# Patient Record
Sex: FEMALE | Race: WHITE | Employment: FULL TIME | URBAN - METROPOLITAN AREA
[De-identification: names, ages, dates, MRNs, and addresses within clinical notes are randomized per-mention and may not be internally consistent; named-entity substitution may affect disease eponyms.]

---

## 2017-01-30 ENCOUNTER — HOSPITAL ENCOUNTER (OUTPATIENT)
Dept: CT IMAGING | Age: 57
Discharge: HOME | End: 2017-01-30
Payer: COMMERCIAL

## 2017-01-30 DIAGNOSIS — T14.8XXA SPRAIN: ICD-10-CM

## 2017-01-30 DIAGNOSIS — S60.221A CONTUSION OF RIGHT HAND, INITIAL ENCOUNTER: ICD-10-CM

## 2017-01-30 PROCEDURE — 73200 CT UPPER EXTREMITY W/O DYE: CPT

## 2020-12-03 ENCOUNTER — APPOINTMENT (OUTPATIENT)
Dept: CT IMAGING | Age: 60
End: 2020-12-03

## 2020-12-03 ENCOUNTER — APPOINTMENT (OUTPATIENT)
Dept: GENERAL RADIOLOGY | Age: 60
End: 2020-12-03

## 2020-12-03 ENCOUNTER — HOSPITAL ENCOUNTER (EMERGENCY)
Age: 60
Discharge: HOME | End: 2020-12-03
Attending: EMERGENCY MEDICINE

## 2020-12-03 VITALS
RESPIRATION RATE: 17 BRPM | BODY MASS INDEX: 22.86 KG/M2 | OXYGEN SATURATION: 99 % | WEIGHT: 129 LBS | HEIGHT: 63 IN | DIASTOLIC BLOOD PRESSURE: 75 MMHG | SYSTOLIC BLOOD PRESSURE: 133 MMHG | TEMPERATURE: 98.1 F | HEART RATE: 94 BPM

## 2020-12-03 PROCEDURE — 73110 X-RAY EXAM OF WRIST: CPT

## 2020-12-03 PROCEDURE — 29125 APPL SHORT ARM SPLINT STATIC: CPT

## 2020-12-03 PROCEDURE — 6370000000 HC RX 637 (ALT 250 FOR IP): Performed by: EMERGENCY MEDICINE

## 2020-12-03 PROCEDURE — 70450 CT HEAD/BRAIN W/O DYE: CPT

## 2020-12-03 PROCEDURE — 99285 EMERGENCY DEPT VISIT HI MDM: CPT

## 2020-12-03 RX ORDER — ACETAMINOPHEN 500 MG
1000 TABLET ORAL ONCE
Status: COMPLETED | OUTPATIENT
Start: 2020-12-03 | End: 2020-12-03

## 2020-12-03 RX ADMIN — ACETAMINOPHEN 1000 MG: 500 TABLET ORAL at 08:06

## 2020-12-03 ASSESSMENT — ENCOUNTER SYMPTOMS
BACK PAIN: 0
VOMITING: 0
ABDOMINAL PAIN: 0
SHORTNESS OF BREATH: 0
DIARRHEA: 0
SORE THROAT: 0
NAUSEA: 0
COUGH: 0

## 2020-12-03 ASSESSMENT — PAIN DESCRIPTION - FREQUENCY
FREQUENCY: CONTINUOUS
FREQUENCY: CONTINUOUS

## 2020-12-03 ASSESSMENT — PAIN DESCRIPTION - LOCATION
LOCATION: HEAD;WRIST
LOCATION: WRIST;HEAD

## 2020-12-03 ASSESSMENT — PAIN DESCRIPTION - DESCRIPTORS
DESCRIPTORS: ACHING
DESCRIPTORS: ACHING

## 2020-12-03 ASSESSMENT — PAIN DESCRIPTION - ORIENTATION
ORIENTATION: LEFT
ORIENTATION: LEFT

## 2020-12-03 ASSESSMENT — PAIN DESCRIPTION - PROGRESSION
CLINICAL_PROGRESSION: GRADUALLY WORSENING
CLINICAL_PROGRESSION: GRADUALLY IMPROVING

## 2020-12-03 ASSESSMENT — PAIN DESCRIPTION - ONSET
ONSET: ON-GOING
ONSET: ON-GOING

## 2020-12-03 ASSESSMENT — PAIN DESCRIPTION - PAIN TYPE
TYPE: ACUTE PAIN
TYPE: ACUTE PAIN

## 2020-12-03 ASSESSMENT — PAIN SCALES - GENERAL
PAINLEVEL_OUTOF10: 5
PAINLEVEL_OUTOF10: 8

## 2020-12-03 NOTE — ED TRIAGE NOTES
Patient arrived from home via self with complaint of slipping on ice. No loc. Hematoma noted to left side of forehead and left wrist swelling. Patient A&OX4. Skin pink, warm, and dry. Neuro wnl.  Ice pack applied to left wrist.

## 2020-12-03 NOTE — ED PROVIDER NOTES
[address] [facility] ED  eMERGENCYdEPARTMENT eNCOUnter      Pt Name: Bibi Pastor  MRN: [id number]  [place] 1960  Date of evaluation: 12/3/2020  Craig Palafox MD    CHIEF COMPLAINT           HPI  Bibi Pastor is a 62 y.o. female per chart review has a h/o COPD/asthma, low back pain presents to the ED s/p fall with headache and L wrist pain. Pt notes 1 hour ago she slipped because of the ice and fell onto her head and L wrist.  Pt notes moderate, constant, aching, nonradiating, L sided headache, L wrist pain. Pt denies fever, n/v, cp, sob, dysuria, LOC, diarrhea. ROS  Review of Systems   Constitutional: Negative for activity change, chills and fever. HENT: Negative for ear pain and sore throat. Eyes: Negative for visual disturbance. Respiratory: Negative for cough and shortness of breath. Cardiovascular: Negative for chest pain, palpitations and leg swelling. Gastrointestinal: Negative for abdominal pain, diarrhea, nausea and vomiting. Genitourinary: Negative for dysuria. Musculoskeletal: Negative for back pain. L wrist pain   Skin: Negative for rash. Neurological: Positive for headaches. Negative for dizziness and weakness. Except as noted above the remainder of the review of systems was reviewed and negative. PAST MEDICAL HISTORY     Past Medical History:   Diagnosis Date    Asthma     Chronic back pain     COPD (chronic obstructive pulmonary disease) ([facility] [address]     Headache(784.0)          SURGICAL HISTORY       Past Surgical History:   Procedure Laterality Date     SECTION      FRACTURE SURGERY      HYSTERECTOMY      TUBAL LIGATION           [address]       Discharge Medication List as of 12/3/2020  9:23 AM      CONTINUE these medications which have NOT CHANGED    Details   fluticasone-salmeterol (ADVAIR HFA) 115-21 MCG/ACT inhaler Inhale 2 puffs into the lungs 2 times daily.         fluticasone (FLOVENT HFA) 110 MCG/ACT inhaler Inhale 2 puffs into the lungs 2 times daily. albuterol (PROVENTIL) 2 MG tablet Take 2 mg by mouth 3 times daily. ALLERGIES     Codeine and Oxytocin    FAMILY HISTORY     History reviewed. No pertinent family history. SOCIAL HISTORY       Social History     Socioeconomic History    Marital status:      Spouse name: None    Number of children: None    Years of education: None    Highest education level: None   Occupational History    None   Social Needs    Financial resource strain: None    Food insecurity     Worry: None     Inability: None    Transportation needs     Medical: None     Non-medical: None   Tobacco Use    Smoking status: Never Smoker    Smokeless tobacco: Never Used   Substance and Sexual Activity    Alcohol use: No    Drug use: No    Sexual activity: None   Lifestyle    Physical activity     Days per week: None     Minutes per session: None    Stress: None   Relationships    Social connections     Talks on phone: None     Gets together: None     Attends Yazidi service: None     Active member of club or organization: None     Attends meetings of clubs or organizations: None     Relationship status: None    Intimate partner violence     Fear of current or ex partner: None     Emotionally abused: None     Physically abused: None     Forced sexual activity: None   Other Topics Concern    None   Social History Narrative    None         PHYSICAL EXAM       ED Triage Vitals [12/03/20 0738]   BP Temp Temp Source Pulse Resp SpO2 Height Weight   (!) 167/92 98.1 °F (36.7 °C) Oral 95 20 100 % 5' 3\" (1.6 m) 129 lb (58.5 kg)       Physical Exam  Vitals signs and nursing note reviewed. Constitutional:       Appearance: She is well-developed. HENT:      Head: Normocephalic.       Comments: 4 cm cephalhematoma noted over L forehead     Right Ear: External ear normal.      Left Ear: External ear normal.   Eyes:      Conjunctiva/sclera: Conjunctivae normal.      Pupils: Pupils are equal, round, and reactive to light. Neck:      Musculoskeletal: Normal range of motion and neck supple. Comments: No cspine tenderness  Cardiovascular:      Rate and Rhythm: Normal rate and regular rhythm. Heart sounds: Normal heart sounds. Pulmonary:      Effort: Pulmonary effort is normal.      Breath sounds: Normal breath sounds. Abdominal:      General: Bowel sounds are normal. There is no distension. Palpations: Abdomen is soft. Tenderness: There is no abdominal tenderness. Musculoskeletal: Normal range of motion. Comments: +Tenderness to palpation over L wrist.  No swelling/gross deformity. 2+ L radial pulse. Skin:     General: Skin is warm and dry. Neurological:      Mental Status: She is alert and oriented to person, place, and time. Psychiatric:         Mood and Affect: Mood normal.           MDM  62 yo female presents to the ED s/p fall with headache, L wrist pain. Pt is allergic to a lot of pain meds and can only tylenol. Pt given PO tylenol in the ED. CT head negative. XR of wrist shows small subtle nondisplaced fx of the L radius. Pt educated about wrist fxs. Pt placed in a splint. Pt only wants to take tylenol and has tylenol at home. Pt given wrist fx and closed head injury warning signs, referred to Ortho and will f/u with Ortho. Pt understands plan. FINAL IMPRESSION      1. Fall, initial encounter    2. Closed head injury, initial encounter    3.  Closed Villagran's fracture of left radius, initial encounter          DISPOSITION/PLAN   DISPOSITION Decision To Discharge 12/03/2020 09:04:22 AM        DISCHARGE MEDICATIONS:  [unfilled]         Romy Thompson MD(electronically signed)  Attending Emergency Physician            Romy Thompson MD  12/03/20 4898       Romy Thompson MD  01/02/21 0045

## 2020-12-03 NOTE — ED NOTES
This RN at bedside to assess pt. Upon assessment, pt is A/Ox4, skin p/w/d, resp even and unlabored, msp's intact. Left radial pulse palp; cap refill < 3 seconds.       Shahnaz Groves RN  12/03/20 1182

## 2020-12-03 NOTE — ED NOTES
Short arm splint w/thumb spica applied at this time. Pt tolerated well. Msp's intact pre- and post-placement. Pt instructed on checking the neurovascular status of limb.       Cale Parkinson RN  12/03/20 6017

## 2020-12-08 ENCOUNTER — OFFICE VISIT (OUTPATIENT)
Dept: ORTHOPEDIC SURGERY | Age: 60
End: 2020-12-08

## 2020-12-08 VITALS
HEIGHT: 63 IN | WEIGHT: 129 LBS | BODY MASS INDEX: 22.86 KG/M2 | OXYGEN SATURATION: 92 % | TEMPERATURE: 96.9 F | HEART RATE: 79 BPM

## 2020-12-08 PROBLEM — S60.212A CONTUSION OF LEFT WRIST: Status: ACTIVE | Noted: 2020-12-08

## 2020-12-08 PROBLEM — S63.592A: Status: ACTIVE | Noted: 2020-12-08

## 2020-12-08 PROCEDURE — 29075 APPL CST ELBW FNGR SHORT ARM: CPT | Performed by: ORTHOPAEDIC SURGERY

## 2020-12-08 PROCEDURE — 99203 OFFICE O/P NEW LOW 30 MIN: CPT | Performed by: ORTHOPAEDIC SURGERY

## 2020-12-08 ASSESSMENT — ENCOUNTER SYMPTOMS
NAUSEA: 0
WHEEZING: 0
COLOR CHANGE: 1
SHORTNESS OF BREATH: 0
VOMITING: 0
ABDOMINAL PAIN: 0
EYE PAIN: 0

## 2020-12-08 NOTE — PATIENT INSTRUCTIONS
Diagnosis: Contusion of left wrist, sprain of left wrist entheses distal radial ulnar joint)    -You have been placed in a short arm cast today to protect the wrist.  Keep cast clean and dry. May shower with cast cover (available at [address] [place] [address]).   -Keep left hand and wrist elevated above the heart with 2-3 pillows while seated or lying down, to minimize swelling.  -May take acetaminophen (Tylenol) 650 mg by mouth every 6 hours as needed for pain.    -Follow-up with Dr. Amina Travis in 2 weeks for cast removal and reassessment of left wrist.

## 2020-12-08 NOTE — PROGRESS NOTES
Subjective:      Patient ID: Bel Nguyen is a 63 y.o. female who presents today for:  Chief Complaint   Patient presents with    Wrist Injury     pt was seen in ED on 12/3/2020 for wrist injury, Xrays were taken, pt states she fell on ice and hit her head and fell on her wrist. pt states pain in wrist comes and goes. HPI:    The patient is a pleasant 67-year-old female history of unclarified bleeding disorder who presents for evaluation of left wrist contusion sustained as result of a fall on 12/3. Also has facial contusions involving the left side. 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 No loss of consciousness. Currently rates pain 6/10, described as dull, aggravated by range of motion. Is been icing the area. Unable to take anti-inflammatories as well as pain medication as per her report, but has been utilizing Tylenol intermittently.     Past Medical History:   Diagnosis Date    Asthma     Chronic back pain     COPD [facility]ch[facility] obstructive [facility] disease) ([facility])     Headache(784.0)      Past Surgical History:   Procedure Laterality Date     SECTION      FRACTURE SURGERY      HYSTERECTOMY      TUBAL LIGATION       Social History     Socioeconomic History    Marital status:      Spouse name: Not on file    Number of children: Not on file    Years of education: Not on file    Highest education level: Not on file   Occupational History    Not on file   Social Needs    Financial resource strain: Not on file    Food insecurity     Worry: Not on file     Inability: Not on file    Transportation needs     Medical: Not on file     Non-medical: Not on file   Tobacco Use    Smoking status: Never Smoker    Smokeless tobacco: Never Used   Substance and Sexual Activity    Alcohol use: No    Drug use: No    Sexual activity: Not on file   Lifestyle    Physical activity     Days per week: Not on file     Minutes per session: Not on file    Stress: Not on file   Relationships    Social connections     Talks on phone: Not on file     Gets together: Not on file     Attends Episcopal service: Not on file     Active member of club or organization: Not on file     Attends meetings of clubs or organizations: Not on file     Relationship status: Not on file    Intimate partner violence     Fear of current or ex partner: Not on file     Emotionally abused: Not on file     Physically abused: Not on file     Forced sexual activity: Not on file   Other Topics Concern    Not on file   Social History Narrative    Not on file     History reviewed. No pertinent family history. Allergies   Allergen Reactions    Codeine     Oxytocin      Current Outpatient Medications on File Prior to Visit   Medication Sig Dispense Refill    fluticasone-salmeterol (ADVAIR HFA) 115-21 MCG/ACT inhaler Inhale 2 puffs into the lungs 2 times daily.  fluticasone (FLOVENT HFA) 110 MCG/ACT inhaler Inhale 2 puffs into the lungs 2 times daily.  albuterol (PROVENTIL) 2 MG tablet Take 2 mg by mouth 3 times daily. No current facility-administered medications on file prior to visit. Acute and Chronic Pain Monitoring:   No flowsheet data found. Review of Systems   Constitutional: Negative for fever. HENT: Negative for tinnitus. Eyes: Negative for pain. Respiratory: Negative for shortness of breath and wheezing. Cardiovascular: Negative for chest pain. Gastrointestinal: Negative for abdominal pain, nausea and vomiting. Endocrine: Negative for cold intolerance and heat intolerance. Genitourinary: Negative for dysuria. Musculoskeletal: Positive for arthralgias (Left wrist pain). Skin: Positive for color change. Negative for rash. Neurological: Negative for weakness.          Objective--Physical Examination:     Pulse 79   Temp 96.9 °F (36.1 °C) options for immobilization to promote comfort and allow her to remain active at work. She notes that her current wrist brace is not providing her with adequate comfort and her employer had recommended that she obtain a cast.  I feel that is a reasonable option. She is placed in a short arm cast today. No specific lifting restriction but should limit pushing and pulling activities with cast in place.     Procedures Performed Today:     Application of left short arm cast    Follow-up (with Radiographs) / Referral:     The patient will follow-up in 2 weeks for repeat clinical assessment following cast removal.    Elen Westbrook MD  Orthopaedic Surgery

## 2020-12-22 ENCOUNTER — OFFICE VISIT (OUTPATIENT)
Dept: ORTHOPEDIC SURGERY | Age: 60
End: 2020-12-22
Payer: COMMERCIAL

## 2020-12-22 VITALS
BODY MASS INDEX: 22.86 KG/M2 | WEIGHT: 129 LBS | HEIGHT: 63 IN | OXYGEN SATURATION: 97 % | TEMPERATURE: 96.4 F | HEART RATE: 80 BPM

## 2020-12-22 PROCEDURE — 99213 OFFICE O/P EST LOW 20 MIN: CPT | Performed by: ORTHOPAEDIC SURGERY

## 2020-12-22 ASSESSMENT — ENCOUNTER SYMPTOMS
WHEEZING: 0
ABDOMINAL PAIN: 0
SHORTNESS OF BREATH: 0
NAUSEA: 0
COLOR CHANGE: 1
EYE PAIN: 0
VOMITING: 0

## 2020-12-22 NOTE — PATIENT INSTRUCTIONS
Diagnosis:  -Resolving sprain and contusion of left wrist and forearm    Recommendations:  -May apply ice packs to forearm for 15-20 minutes every 4 hours while awake, to decrease swelling and bruising.  -May take acetaminophen (Tylenol) 650 mg by mouth every 6 hours as needed to control pain.  -Limit pushing, pulling activities as well as lifting over 5 pounds for another 10-14 days.    -You are welcome to follow-up with Dr. Eileen Soto as needed in regard to left forearm injury.

## 2020-12-22 NOTE — PROGRESS NOTES
Subjective:      Patient ID: Billy Alberto is a 62 y.o. female who presents today for:  Chief Complaint   Patient presents with    Follow-up     PT here for f/u of left wrist, pt states wrist is still sore but feels ok. HPI:    The patient is seen for follow-up of left wrist injury, localizing the DRUJ with some component of volar contusion of the wrist.  Has been immobilized in a cast and continues to work. Was concerned about incidental contact in her capacities as a . She is currently off work until . Pain is been well controlled and she has been compliant with activity restrictions. Current pain level 3/10, aggravated by direct pressure over the wrist as cast has been removed today.   Past Medical History:   Diagnosis Date    Asthma     Chronic back pain     [facility])     Headache(784.0)      Past Surgical History:   Procedure Laterality Date     SECTION      FRACTURE SURGERY      HYSTERECTOMY      TUBAL LIGATION       Social History     Socioeconomic History    Marital status:      Spouse name: Not on file    Number of children: Not on file    Years of education: Not on file    Highest education level: Not on file   Occupational History    Not on file   Social Needs    Financial resource strain: Not on file    Food insecurity     Worry: Not on file     Inability: Not on file    Transportation needs     Medical: Not on file     Non-medical: Not on file   Tobacco Use    Smoking status: Never Smoker    Smokeless tobacco: Never Used   Substance and Sexual Activity    Alcohol use: No    Drug use: No    Sexual activity: Not on file   Lifestyle    Physical activity     Days per week: Not on file     Minutes per session: Not on file    Stress: Not on file   Relationships    Social connections     Talks on phone: Not on file     Gets together: Not on file     Attends Holiness service: Not on file Active member of club or organization: Not on file     Attends meetings of clubs or organizations: Not on file     Relationship status: Not on file    Intimate partner violence     Fear of current or ex partner: Not on file     Emotionally abused: Not on file     Physically abused: Not on file     Forced sexual activity: Not on file   Other Topics Concern    Not on file   Social History Narrative    Not on file     History reviewed. No pertinent family history. Allergies   Allergen Reactions    Codeine     Oxytocin      Current Outpatient Medications on File Prior to Visit   Medication Sig Dispense Refill    fluticasone-salmeterol (ADVAIR HFA) 115-21 MCG/ACT inhaler Inhale 2 puffs into the lungs 2 times daily.  fluticasone (FLOVENT HFA) 110 MCG/ACT inhaler Inhale 2 puffs into the lungs 2 times daily.  albuterol (PROVENTIL) 2 MG tablet Take 2 mg by mouth 3 times daily. No current facility-administered medications on file prior to visit. Acute and Chronic Pain Monitoring:   No flowsheet data found. Review of Systems   Constitutional: Negative for fever. HENT: Negative for tinnitus. Eyes: Negative for pain. Respiratory: Negative for shortness of breath and wheezing. Cardiovascular: Negative for chest pain. Gastrointestinal: Negative for abdominal pain, nausea and vomiting. Endocrine: Negative for cold intolerance and heat intolerance. Genitourinary: Negative for dysuria. Musculoskeletal: Positive for arthralgias (Improve left forearm pain). Skin: Positive for color change (Contusion over volar extent of left forearm). Negative for rash. Neurological: Negative for weakness and numbness.        Objective--Physical Examination:     Pulse 80   Temp 96.4 °F (35.8 °C) (Temporal)   Ht 5' 3\" (1.6 m)   Wt 129 lb (58.5 kg)   SpO2 97%   BMI 22.85 kg/m²     Physical Examination:  Pleasant 52-year-old female in mild to minimal distress, alert and cooperative. Examination left upper extremity reveals painless shoulder and elbow range of motion. Compartments of the forearm are supple. She is essentially nontender over the DRUJ and ulnar styloid and demonstrates constitutional prominence of the ulna bilaterally. Contusion is dissipated along the distal extent of the forearm with trace amount evident over the volar extent of the wrist only. No evidence of instability with piano key test but is tender over the DRUJ. No snuffbox tenderness, negative scaphoid shift test.  Able to approximate the thumb the lesser digits. Sensory intact light touch in the radial, median, ulnar nerve distributions. Radial pulse 2+ with capillary refill less than 2 seconds.  strength, finger abduction, EPL strength are 5/5. No new radiographs obtained    Radiographs and Laboratory Studies:     Diagnostic Imaging Studies:    No new radiographs obtained today    Laboratory Studies:   No results found for: WBC, HGB, HCT, MCV, PLT  No results found for: SEDRATE  No results found for: CRP    Assessment / Plan:      Diagnosis Orders   1. Contusion of left wrist, subsequent encounter     2. Sprain of left distal radioulnar joint, subsequent encounter        No orders of the defined types were placed in this encounter. No orders of the defined types were placed in this encounter. Resolving left wrist contusion and sprain of distal radial ulnar joint: Patient doing much better following brief course of immobilization. Discussed options moving forward including use of a  wrist style brace as well as an Exos brace. She notes that she is not returning to work until January 4 and as such would like to attempt observation alone without further immobilization. Would have her hold off on lifting more than 5 pounds with left hand, no pushing or pulling for another 7-10 days. We will continue with anti-inflammatories and icing intermittently.   May utilize Tylenol for pain.    Procedures Performed Today:     Removal of left short arm cast    Follow-up (with Radiographs) / Referral:     The patient is welcome to follow-up with me as needed in regard to left wrist injury and contusion, DRUJ sprain.     Jose Mathew MD  Orthopaedic Surgery

## 2021-01-17 ENCOUNTER — HOSPITAL ENCOUNTER (EMERGENCY)
Age: 61
Discharge: HOME | End: 2021-01-17
Attending: EMERGENCY MEDICINE

## 2021-01-17 ENCOUNTER — APPOINTMENT (OUTPATIENT)
Dept: GENERAL RADIOLOGY | Age: 61
End: 2021-01-17

## 2021-01-17 ENCOUNTER — APPOINTMENT (OUTPATIENT)
Dept: CT IMAGING | Age: 61
End: 2021-01-17

## 2021-01-17 VITALS
DIASTOLIC BLOOD PRESSURE: 56 MMHG | WEIGHT: 128 LBS | RESPIRATION RATE: 18 BRPM | TEMPERATURE: 97.6 F | HEART RATE: 99 BPM | OXYGEN SATURATION: 98 % | HEIGHT: 63 IN | SYSTOLIC BLOOD PRESSURE: 140 MMHG | BODY MASS INDEX: 22.68 KG/M2

## 2021-01-17 DIAGNOSIS — R51.9 ACUTE NONINTRACTABLE HEADACHE, UNSPECIFIED HEADACHE TYPE: ICD-10-CM

## 2021-01-17 DIAGNOSIS — J44.1 COPD WITH ACUTE EXACERBATION (HCC): Primary | ICD-10-CM

## 2021-01-17 DIAGNOSIS — R42 DIZZINESS: ICD-10-CM

## 2021-01-17 LAB
ALBUMIN SERPL-MCNC: 4.5 G/DL (ref 3.5–4.6)
ALP BLD-CCNC: 87 U/L (ref 40–130)
ALT SERPL-CCNC: 6 U/L (ref 0–33)
ANION GAP SERPL CALCULATED.3IONS-SCNC: 13 MEQ/L (ref 9–15)
AST SERPL-CCNC: 10 U/L (ref 0–35)
BASOPHILS ABSOLUTE: 0.1 K/UL (ref 0–0.2)
BASOPHILS RELATIVE PERCENT: 0.6 %
BILIRUB SERPL-MCNC: 0.3 MG/DL (ref 0.2–0.7)
BUN BLDV-MCNC: 14 MG/DL (ref 6–20)
CALCIUM SERPL-MCNC: 10.1 MG/DL (ref 8.5–9.9)
CHLORIDE BLD-SCNC: 102 MEQ/L (ref 95–107)
CO2: 24 MEQ/L (ref 20–31)
CREAT SERPL-MCNC: 0.63 MG/DL (ref 0.5–0.9)
EKG ATRIAL RATE: 106 BPM
EKG P AXIS: 36 DEGREES
EKG P-R INTERVAL: 138 MS
EKG Q-T INTERVAL: 342 MS
EKG QRS DURATION: 82 MS
EKG QTC CALCULATION (BAZETT): 454 MS
EKG R AXIS: 17 DEGREES
EKG T AXIS: 61 DEGREES
EKG VENTRICULAR RATE: 106 BPM
EOSINOPHILS ABSOLUTE: 0 K/UL (ref 0–0.7)
EOSINOPHILS RELATIVE PERCENT: 0.1 %
GFR AFRICAN AMERICAN: >60
GFR NON-AFRICAN AMERICAN: >60
GLOBULIN: 3.2 G/DL (ref 2.3–3.5)
GLUCOSE BLD-MCNC: 119 MG/DL (ref 70–99)
HCT VFR BLD CALC: 45.6 % (ref 37–47)
HEMOGLOBIN: 15 G/DL (ref 12–16)
LYMPHOCYTES ABSOLUTE: 2 K/UL (ref 1–4.8)
LYMPHOCYTES RELATIVE PERCENT: 8.7 %
MAGNESIUM: 2.2 MG/DL (ref 1.7–2.4)
MCH RBC QN AUTO: 27.8 PG (ref 27–31.3)
MCHC RBC AUTO-ENTMCNC: 32.9 % (ref 33–37)
MCV RBC AUTO: 84.6 FL (ref 82–100)
MONOCYTES ABSOLUTE: 1.8 K/UL (ref 0.2–0.8)
MONOCYTES RELATIVE PERCENT: 8 %
NEUTROPHILS ABSOLUTE: 18.5 K/UL (ref 1.4–6.5)
NEUTROPHILS RELATIVE PERCENT: 82.6 %
PDW BLD-RTO: 14.3 % (ref 11.5–14.5)
PLATELET # BLD: 437 K/UL (ref 130–400)
POTASSIUM SERPL-SCNC: 4 MEQ/L (ref 3.4–4.9)
PRO-BNP: 150 PG/ML
RBC # BLD: 5.39 M/UL (ref 4.2–5.4)
SODIUM BLD-SCNC: 139 MEQ/L (ref 135–144)
TOTAL PROTEIN: 7.7 G/DL (ref 6.3–8)
TROPONIN: <0.01 NG/ML (ref 0–0.01)
WBC # BLD: 22.5 K/UL (ref 4.8–10.8)

## 2021-01-17 PROCEDURE — 6360000002 HC RX W HCPCS: Performed by: EMERGENCY MEDICINE

## 2021-01-17 PROCEDURE — 6370000000 HC RX 637 (ALT 250 FOR IP): Performed by: EMERGENCY MEDICINE

## 2021-01-17 PROCEDURE — 96365 THER/PROPH/DIAG IV INF INIT: CPT

## 2021-01-17 PROCEDURE — 36415 COLL VENOUS BLD VENIPUNCTURE: CPT

## 2021-01-17 PROCEDURE — 94761 N-INVAS EAR/PLS OXIMETRY MLT: CPT

## 2021-01-17 PROCEDURE — 96375 TX/PRO/DX INJ NEW DRUG ADDON: CPT

## 2021-01-17 PROCEDURE — 99284 EMERGENCY DEPT VISIT MOD MDM: CPT

## 2021-01-17 PROCEDURE — 85025 COMPLETE CBC W/AUTO DIFF WBC: CPT

## 2021-01-17 PROCEDURE — 80053 COMPREHEN METABOLIC PANEL: CPT

## 2021-01-17 PROCEDURE — 83880 ASSAY OF NATRIURETIC PEPTIDE: CPT

## 2021-01-17 PROCEDURE — 2700000000 HC OXYGEN THERAPY PER DAY

## 2021-01-17 PROCEDURE — 84484 ASSAY OF TROPONIN QUANT: CPT

## 2021-01-17 PROCEDURE — 94640 AIRWAY INHALATION TREATMENT: CPT

## 2021-01-17 PROCEDURE — 2580000003 HC RX 258: Performed by: EMERGENCY MEDICINE

## 2021-01-17 PROCEDURE — 71045 X-RAY EXAM CHEST 1 VIEW: CPT

## 2021-01-17 PROCEDURE — 93005 ELECTROCARDIOGRAM TRACING: CPT | Performed by: EMERGENCY MEDICINE

## 2021-01-17 PROCEDURE — 83735 ASSAY OF MAGNESIUM: CPT

## 2021-01-17 PROCEDURE — 70450 CT HEAD/BRAIN W/O DYE: CPT

## 2021-01-17 RX ORDER — KETOROLAC TROMETHAMINE 30 MG/ML
30 INJECTION, SOLUTION INTRAMUSCULAR; INTRAVENOUS ONCE
Status: COMPLETED | OUTPATIENT
Start: 2021-01-17 | End: 2021-01-17

## 2021-01-17 RX ORDER — 0.9 % SODIUM CHLORIDE 0.9 %
1000 INTRAVENOUS SOLUTION INTRAVENOUS ONCE
Status: COMPLETED | OUTPATIENT
Start: 2021-01-17 | End: 2021-01-17

## 2021-01-17 RX ORDER — AZITHROMYCIN 500 MG/1
500 TABLET, FILM COATED ORAL ONCE
Status: COMPLETED | OUTPATIENT
Start: 2021-01-17 | End: 2021-01-17

## 2021-01-17 RX ORDER — ALBUTEROL SULFATE 2.5 MG/3ML
2.5 SOLUTION RESPIRATORY (INHALATION)
Status: COMPLETED | OUTPATIENT
Start: 2021-01-17 | End: 2021-01-17

## 2021-01-17 RX ORDER — ONDANSETRON 2 MG/ML
4 INJECTION INTRAMUSCULAR; INTRAVENOUS ONCE
Status: COMPLETED | OUTPATIENT
Start: 2021-01-17 | End: 2021-01-17

## 2021-01-17 RX ORDER — MAGNESIUM SULFATE IN WATER 40 MG/ML
2 INJECTION, SOLUTION INTRAVENOUS ONCE
Status: COMPLETED | OUTPATIENT
Start: 2021-01-17 | End: 2021-01-17

## 2021-01-17 RX ORDER — DEXAMETHASONE SODIUM PHOSPHATE 10 MG/ML
6 INJECTION INTRAMUSCULAR; INTRAVENOUS ONCE
Status: COMPLETED | OUTPATIENT
Start: 2021-01-17 | End: 2021-01-17

## 2021-01-17 RX ORDER — DEXAMETHASONE 6 MG/1
6 TABLET ORAL
Qty: 10 TABLET | Refills: 0 | Status: SHIPPED | OUTPATIENT
Start: 2021-01-17 | End: 2021-01-27

## 2021-01-17 RX ORDER — OXYCODONE HYDROCHLORIDE AND ACETAMINOPHEN 5; 325 MG/1; MG/1
1 TABLET ORAL EVERY 6 HOURS PRN
Qty: 12 TABLET | Refills: 0 | Status: SHIPPED | OUTPATIENT
Start: 2021-01-17 | End: 2021-01-20

## 2021-01-17 RX ORDER — ACETAMINOPHEN 500 MG
1000 TABLET ORAL ONCE
Status: DISCONTINUED | OUTPATIENT
Start: 2021-01-17 | End: 2021-01-17 | Stop reason: HOSPADM

## 2021-01-17 RX ADMIN — ALBUTEROL SULFATE 2.5 MG: 2.5 SOLUTION RESPIRATORY (INHALATION) at 10:55

## 2021-01-17 RX ADMIN — MAGNESIUM SULFATE HEPTAHYDRATE 2 G: 40 INJECTION, SOLUTION INTRAVENOUS at 11:15

## 2021-01-17 RX ADMIN — KETOROLAC TROMETHAMINE 30 MG: 30 INJECTION, SOLUTION INTRAMUSCULAR at 11:13

## 2021-01-17 RX ADMIN — ALBUTEROL SULFATE 2.5 MG: 2.5 SOLUTION RESPIRATORY (INHALATION) at 10:57

## 2021-01-17 RX ADMIN — ALBUTEROL SULFATE 2.5 MG: 2.5 SOLUTION RESPIRATORY (INHALATION) at 10:54

## 2021-01-17 RX ADMIN — SODIUM CHLORIDE 1000 ML: 9 INJECTION, SOLUTION INTRAVENOUS at 11:13

## 2021-01-17 RX ADMIN — ONDANSETRON 4 MG: 2 INJECTION INTRAMUSCULAR; INTRAVENOUS at 11:14

## 2021-01-17 RX ADMIN — HYDROMORPHONE HYDROCHLORIDE 1 MG: 1 INJECTION, SOLUTION INTRAMUSCULAR; INTRAVENOUS; SUBCUTANEOUS at 11:14

## 2021-01-17 RX ADMIN — DEXAMETHASONE SODIUM PHOSPHATE 6 MG: 10 INJECTION INTRAMUSCULAR; INTRAVENOUS at 11:14

## 2021-01-17 RX ADMIN — AZITHROMYCIN MONOHYDRATE 500 MG: 500 TABLET ORAL at 11:14

## 2021-01-17 ASSESSMENT — PAIN DESCRIPTION - LOCATION: LOCATION: HEAD

## 2021-01-17 ASSESSMENT — ENCOUNTER SYMPTOMS
VOMITING: 0
COUGH: 1
DIARRHEA: 0
ABDOMINAL PAIN: 0
NAUSEA: 0
SHORTNESS OF BREATH: 1
SORE THROAT: 0
BACK PAIN: 0

## 2021-01-17 ASSESSMENT — PAIN DESCRIPTION - PAIN TYPE: TYPE: ACUTE PAIN

## 2021-01-17 ASSESSMENT — PAIN SCALES - GENERAL: PAINLEVEL_OUTOF10: 7

## 2021-01-17 NOTE — ED TRIAGE NOTES
Pt here with complaints of shortness of breath that has been going on for about a week. She does have history of asthma and COPD. She has been using a nebulizer, and is currently on her second round of antibiotics and steroids. She does complain of a 7/10 constant headache. She fell in the beginning of December and hit her head.

## 2021-01-17 NOTE — ED PROVIDER NOTES
[address] [facility] ED  eMERGENCYdEPARTMENT eNCOUnter      Pt Name: Gabriel Limon  MRN: [id number]  Arn[place] 1960  Date of evaluation: 2021  Lb Murphy MD    CHIEF COMPLAINT           HPI  Gabriel Limon is a 62 y.o. female per chart review has a h/o COPD, asthma, low back pain presents to the ED with sob, cough, headache, dizziness. Pt notes gradual onset, moderate, constant, sob x 2 weeks. +Cough. Pt also notes she fell 1 month ago and has been having intermittent, throbbing, L temporal headaches since then. +Dizziness. Pt's CT head 1 month ago was negative. Pt denies fever, n/v, cp, ab pain, dysuria, diarrhea. Pt states her sob feels like her asthma/COPD. ROS  Review of Systems   Constitutional: Negative for activity change, chills and fever. HENT: Negative for ear pain and sore throat. Eyes: Negative for visual disturbance. Respiratory: Positive for cough and shortness of breath. Cardiovascular: Negative for chest pain, palpitations and leg swelling. Gastrointestinal: Negative for abdominal pain, diarrhea, nausea and vomiting. Genitourinary: Negative for dysuria. Musculoskeletal: Negative for back pain. Skin: Negative for rash. Neurological: Positive for dizziness and headaches. Negative for weakness. Except as noted above the remainder of the review of systems was reviewed and negative. PAST MEDICAL HISTORY     Past Medical History:   Diagnosis Date    Asthma     Chronic back pain     COPD (chronic obstructive pulmonary disease) ([address]     Headache(784.0)          SURGICAL HISTORY       Past Surgical History:   Procedure Laterality Date     SECTION      FRACTURE SURGERY      HYSTERECTOMY      TUBAL LIGATION           CURRENTMEDICATIONS       Previous Medications    ALBUTEROL (PROVENTIL) 2 MG TABLET    Take 2 mg by mouth 3 times daily.       FLUTICASONE (FLOVENT HFA) 110 MCG/ACT INHALER    Inhale 2 puffs into the lungs 2 times daily. FLUTICASONE-SALMETEROL (ADVAIR HFA) 115-21 MCG/ACT INHALER    Inhale 2 puffs into the lungs 2 times daily. ALLERGIES     Codeine and Oxytocin    FAMILY HISTORY     History reviewed. No pertinent family history. SOCIAL HISTORY       Social History     Socioeconomic History    Marital status:      Spouse name: None    Number of children: None    Years of education: None    Highest education level: None   Occupational History    None   Social Needs    Financial resource strain: None    Food insecurity     Worry: None     Inability: None    Transportation needs     Medical: None     Non-medical: None   Tobacco Use    Smoking status: Never Smoker    Smokeless tobacco: Never Used   Substance and Sexual Activity    Alcohol use: No    Drug use: No    Sexual activity: None   Lifestyle    Physical activity     Days per week: None     Minutes per session: None    Stress: None   Relationships    Social connections     Talks on phone: None     Gets together: None     Attends Rastafarian service: None     Active member of club or organization: None     Attends meetings of clubs or organizations: None     Relationship status: None    Intimate partner violence     Fear of current or ex partner: None     Emotionally abused: None     Physically abused: None     Forced sexual activity: None   Other Topics Concern    None   Social History Narrative    None         PHYSICAL EXAM       ED Triage Vitals [01/17/21 1030]   BP Temp Temp Source Pulse Resp SpO2 Height Weight   -- 97.6 °F (36.4 °C) Oral 96 20 97 % 5' 3\" (1.6 m) 128 lb (58.1 kg)       Physical Exam  Vitals signs and nursing note reviewed. Constitutional:       Appearance: She is well-developed. HENT:      Head: Normocephalic.       Right Ear: External ear normal.      Left Ear: External ear normal.   Eyes:      Conjunctiva/sclera: Conjunctivae normal.      Pupils: Pupils are equal, round, and reactive to light.   Neck:      Musculoskeletal: Normal range of motion and neck supple. Comments: No nuchal rigidity  Cardiovascular:      Rate and Rhythm: Normal rate and regular rhythm. Heart sounds: Normal heart sounds. Pulmonary:      Effort: Pulmonary effort is normal.      Breath sounds: Examination of the right-lower field reveals decreased breath sounds. Decreased breath sounds present. Abdominal:      General: Bowel sounds are normal. There is no distension. Palpations: Abdomen is soft. Tenderness: There is no abdominal tenderness. Musculoskeletal: Normal range of motion. Skin:     General: Skin is warm and dry. Neurological:      Mental Status: She is alert and oriented to person, place, and time. Psychiatric:         Mood and Affect: Mood normal.           MDM  61 yo female presents to the ED with sob, cough, headache, dizziness. Pt is afebrile, hemodynamically stable. Very low suspicion for SAH, meningitis. Pt given 1 L NS, IV dilaudid, IV zofran, IV toradol, PO tylenol in the ED for headache. Pt also given albuterol nebs x 3, IV solumedrol, PO azithro, IV Mg in the ED. EKG shows sinus tach with , normal axis, normal intervals, no ST changes. Labs remarkable for WBC 22. CXR negative. Pt reassessed and feels completely better. Pt with a h/o pericardial tamponade. Bedside echo done which was negative for effusion. Pt likely with leukocytosis due to steroids. Pt given prescription for percocet, decadron. Pt given COPD and headache warning signs and will f/u with pcp. Pt understands plan. FINAL IMPRESSION      1. COPD with acute exacerbation ([address]    2. Acute nonintractable headache, unspecified headache type    3.  Dizziness          DISPOSITION/PLAN   DISPOSITION Decision To Discharge 01/17/2021 12:14:43 PM        DISCHARGE MEDICATIONS:  [unfilled]         Marques Holcomb MD(electronically signed)  Attending Emergency Physician           Marques Holcomb, MD  01/17/21 9172

## 2021-01-18 PROCEDURE — 93010 ELECTROCARDIOGRAM REPORT: CPT | Performed by: INTERNAL MEDICINE

## 2023-02-28 PROBLEM — R51.9 HEADACHE: Status: ACTIVE | Noted: 2023-02-28

## 2023-02-28 PROBLEM — I45.10 INCOMPLETE RBBB: Status: ACTIVE | Noted: 2023-02-28

## 2023-02-28 PROBLEM — N28.1 CYST OF LEFT KIDNEY: Status: ACTIVE | Noted: 2023-02-28

## 2023-02-28 PROBLEM — I51.7 LVH (LEFT VENTRICULAR HYPERTROPHY): Status: ACTIVE | Noted: 2023-02-28

## 2023-02-28 PROBLEM — J30.9 ALLERGIC RHINITIS: Status: ACTIVE | Noted: 2023-02-28

## 2023-02-28 PROBLEM — J44.9 COPD (CHRONIC OBSTRUCTIVE PULMONARY DISEASE) (MULTI): Status: ACTIVE | Noted: 2023-02-28

## 2023-02-28 PROBLEM — R00.2 PALPITATIONS: Status: ACTIVE | Noted: 2023-02-28

## 2023-02-28 PROBLEM — D69.9 BLEEDING DISORDER (CMS-HCC): Status: ACTIVE | Noted: 2023-02-28

## 2023-02-28 PROBLEM — R05.8 RECURRENT COUGH: Status: ACTIVE | Noted: 2023-02-28

## 2023-02-28 PROBLEM — I49.9 ARRHYTHMIA: Status: ACTIVE | Noted: 2023-02-28

## 2023-02-28 PROBLEM — I47.10 PSVT (PAROXYSMAL SUPRAVENTRICULAR TACHYCARDIA) (CMS-HCC): Status: ACTIVE | Noted: 2023-02-28

## 2023-02-28 PROBLEM — R42 LIGHTHEADEDNESS: Status: ACTIVE | Noted: 2023-02-28

## 2023-02-28 PROBLEM — R53.83 FATIGUE: Status: ACTIVE | Noted: 2023-02-28

## 2023-02-28 PROBLEM — R60.0 LEG EDEMA: Status: ACTIVE | Noted: 2023-02-28

## 2023-02-28 PROBLEM — I31.39 PERICARDIAL EFFUSION (HHS-HCC): Status: ACTIVE | Noted: 2023-02-28

## 2023-02-28 PROBLEM — F41.9 ANXIETY: Status: ACTIVE | Noted: 2023-02-28

## 2023-02-28 PROBLEM — I49.3 PVC (PREMATURE VENTRICULAR CONTRACTION): Status: ACTIVE | Noted: 2023-02-28

## 2023-02-28 PROBLEM — R00.0 TACHYCARDIA: Status: ACTIVE | Noted: 2023-02-28

## 2023-02-28 PROBLEM — R91.1 LUNG NODULE: Status: ACTIVE | Noted: 2023-02-28

## 2023-02-28 PROBLEM — J01.90 ACUTE SINUSITIS: Status: ACTIVE | Noted: 2023-02-28

## 2023-02-28 PROBLEM — F43.10 PTSD (POST-TRAUMATIC STRESS DISORDER): Status: ACTIVE | Noted: 2023-02-28

## 2023-02-28 PROBLEM — K22.2 ESOPHAGEAL STRICTURE: Status: ACTIVE | Noted: 2023-02-28

## 2023-02-28 PROBLEM — M25.532 LEFT WRIST PAIN: Status: ACTIVE | Noted: 2023-02-28

## 2023-02-28 PROBLEM — S06.0XAA CONCUSSION: Status: ACTIVE | Noted: 2023-02-28

## 2023-02-28 PROBLEM — R13.10 DYSPHAGIA: Status: ACTIVE | Noted: 2023-02-28

## 2023-02-28 PROBLEM — R07.9 CHEST PAIN: Status: ACTIVE | Noted: 2023-02-28

## 2023-02-28 PROBLEM — R10.9 ABDOMINAL PAIN, RIGHT LATERAL: Status: ACTIVE | Noted: 2023-02-28

## 2023-02-28 PROBLEM — I49.1 APC (ATRIAL PREMATURE CONTRACTIONS): Status: ACTIVE | Noted: 2023-02-28

## 2023-02-28 PROBLEM — J45.909 ASTHMATIC BRONCHITIS (HHS-HCC): Status: ACTIVE | Noted: 2023-02-28

## 2023-02-28 PROBLEM — I10 HTN (HYPERTENSION): Status: ACTIVE | Noted: 2023-02-28

## 2023-02-28 PROBLEM — R53.1 GENERAL WEAKNESS: Status: ACTIVE | Noted: 2023-02-28

## 2023-02-28 PROBLEM — R94.31 ABNORMAL ECG: Status: ACTIVE | Noted: 2023-02-28

## 2023-02-28 RX ORDER — ALBUTEROL SULFATE 0.83 MG/ML
2.5 SOLUTION RESPIRATORY (INHALATION)
COMMUNITY
Start: 2020-04-22

## 2023-02-28 RX ORDER — LANOLIN ALCOHOL/MO/W.PET/CERES
1 CREAM (GRAM) TOPICAL DAILY
COMMUNITY
Start: 2022-08-31 | End: 2023-03-13 | Stop reason: SDDI

## 2023-02-28 RX ORDER — PANTOPRAZOLE SODIUM 40 MG/1
40 TABLET, DELAYED RELEASE ORAL DAILY
COMMUNITY
Start: 2022-08-31

## 2023-02-28 RX ORDER — MONTELUKAST SODIUM 10 MG/1
10 TABLET ORAL DAILY
COMMUNITY
Start: 2020-04-28 | End: 2023-03-13 | Stop reason: SDUPTHER

## 2023-02-28 RX ORDER — SPIRONOLACTONE 25 MG/1
50 TABLET ORAL DAILY
COMMUNITY
Start: 2022-09-30 | End: 2023-12-13 | Stop reason: SDUPTHER

## 2023-02-28 RX ORDER — ALBUTEROL SULFATE 90 UG/1
1 AEROSOL, METERED RESPIRATORY (INHALATION) EVERY 4 HOURS PRN
COMMUNITY
Start: 2021-01-12 | End: 2023-11-29 | Stop reason: SDUPTHER

## 2023-02-28 RX ORDER — METOPROLOL SUCCINATE 100 MG/1
150 TABLET, EXTENDED RELEASE ORAL DAILY
COMMUNITY
End: 2023-12-13 | Stop reason: SDUPTHER

## 2023-02-28 RX ORDER — BUDESONIDE AND FORMOTEROL FUMARATE DIHYDRATE 80; 4.5 UG/1; UG/1
2 AEROSOL RESPIRATORY (INHALATION) 2 TIMES DAILY
COMMUNITY
End: 2023-11-29 | Stop reason: SDUPTHER

## 2023-02-28 RX ORDER — FLUTICASONE PROPIONATE 50 MCG
1 SPRAY, SUSPENSION (ML) NASAL
COMMUNITY
End: 2023-11-01

## 2023-02-28 RX ORDER — LORATADINE 10 MG/1
1 TABLET ORAL DAILY
COMMUNITY
Start: 2022-06-06 | End: 2023-03-13 | Stop reason: SDUPTHER

## 2023-03-13 ENCOUNTER — OFFICE VISIT (OUTPATIENT)
Dept: PRIMARY CARE | Facility: CLINIC | Age: 63
End: 2023-03-13
Payer: COMMERCIAL

## 2023-03-13 VITALS
WEIGHT: 118 LBS | DIASTOLIC BLOOD PRESSURE: 92 MMHG | TEMPERATURE: 97 F | HEART RATE: 86 BPM | RESPIRATION RATE: 18 BRPM | OXYGEN SATURATION: 95 % | HEIGHT: 63 IN | SYSTOLIC BLOOD PRESSURE: 138 MMHG | BODY MASS INDEX: 20.91 KG/M2

## 2023-03-13 DIAGNOSIS — R51.9 SINUS HEADACHE: Primary | ICD-10-CM

## 2023-03-13 DIAGNOSIS — H92.02 EAR PAIN, LEFT: ICD-10-CM

## 2023-03-13 DIAGNOSIS — R05.8 RECURRENT COUGH: ICD-10-CM

## 2023-03-13 PROCEDURE — 3075F SYST BP GE 130 - 139MM HG: CPT | Performed by: FAMILY MEDICINE

## 2023-03-13 PROCEDURE — 1036F TOBACCO NON-USER: CPT | Performed by: FAMILY MEDICINE

## 2023-03-13 PROCEDURE — 99214 OFFICE O/P EST MOD 30 MIN: CPT | Performed by: FAMILY MEDICINE

## 2023-03-13 PROCEDURE — 3080F DIAST BP >= 90 MM HG: CPT | Performed by: FAMILY MEDICINE

## 2023-03-13 RX ORDER — BENZONATATE 100 MG/1
100 CAPSULE ORAL AS NEEDED
Qty: 90 CAPSULE | Refills: 3 | Status: SHIPPED | OUTPATIENT
Start: 2023-03-13

## 2023-03-13 RX ORDER — OFLOXACIN 3 MG/ML
5 SOLUTION AURICULAR (OTIC) 2 TIMES DAILY
Qty: 10 ML | Refills: 1 | Status: SHIPPED | OUTPATIENT
Start: 2023-03-13 | End: 2023-03-20

## 2023-03-13 RX ORDER — FLUTICASONE PROPIONATE AND SALMETEROL 500; 50 UG/1; UG/1
1 POWDER RESPIRATORY (INHALATION)
Qty: 60 EACH | Refills: 11 | Status: SHIPPED | OUTPATIENT
Start: 2023-03-13 | End: 2023-12-13 | Stop reason: ALTCHOICE

## 2023-03-13 RX ORDER — MONTELUKAST SODIUM 10 MG/1
10 TABLET ORAL DAILY
Qty: 90 TABLET | Refills: 3 | Status: SHIPPED | OUTPATIENT
Start: 2023-03-13

## 2023-03-13 RX ORDER — CEFDINIR 300 MG/1
300 CAPSULE ORAL 2 TIMES DAILY
Qty: 14 CAPSULE | Refills: 0 | Status: SHIPPED | OUTPATIENT
Start: 2023-03-13 | End: 2023-03-20

## 2023-03-13 RX ORDER — BENZONATATE 100 MG/1
100 CAPSULE ORAL AS NEEDED
COMMUNITY
Start: 2022-09-12 | End: 2023-03-13 | Stop reason: SDUPTHER

## 2023-03-13 RX ORDER — LORATADINE 10 MG/1
10 TABLET ORAL DAILY
Qty: 90 TABLET | Refills: 3 | Status: SHIPPED | OUTPATIENT
Start: 2023-03-13

## 2023-03-13 ASSESSMENT — ENCOUNTER SYMPTOMS
COUGH: 1
HEADACHES: 1
RHINORRHEA: 1
SORE THROAT: 0
VOMITING: 0

## 2023-03-13 NOTE — PROGRESS NOTES
"Subjective   Patient ID: Good Appiah is a 62 y.o. female who presents for Earache (Left ear ache , pressure in both ears  , jaw pain/) and Sinusitis (Facial pressure / pain , headache , cough , nasal congestion , post nasal drainage had sinus issues since November has appointment 3/27/2023/).    Earache   There is pain in the left ear. The current episode started 1 to 4 weeks ago. The problem occurs constantly. The problem has been unchanged. There has been no fever. The pain is moderate. Associated symptoms include coughing, headaches and rhinorrhea. Pertinent negatives include no hearing loss, sore throat or vomiting.   Sinusitis  This is a recurrent problem. The current episode started more than 1 month ago. The problem is unchanged. There has been no fever. Associated symptoms include coughing, ear pain and headaches. Pertinent negatives include no sore throat. Past treatments include antibiotics.        Review of Systems   HENT:  Positive for ear pain and rhinorrhea. Negative for hearing loss and sore throat.    Respiratory:  Positive for cough.    Gastrointestinal:  Negative for vomiting.   Neurological:  Positive for headaches.       Objective   BP (!) 138/92   Pulse 86   Temp 36.1 °C (97 °F)   Resp 18   Ht 1.6 m (5' 3\")   Wt 53.5 kg (118 lb)   SpO2 95%   BMI 20.90 kg/m²     Physical Exam  Vitals reviewed.   Constitutional:       Appearance: Normal appearance.   HENT:      Head: Normocephalic.      Right Ear: External ear normal.      Left Ear: External ear normal.      Nose: Congestion (yellow  coryza) and rhinorrhea (maxilllary sinus  tenderness) present.      Mouth/Throat:      Mouth: Mucous membranes are moist.   Eyes:      Conjunctiva/sclera: Conjunctivae normal.   Cardiovascular:      Rate and Rhythm: Regular rhythm.      Heart sounds: Normal heart sounds.   Pulmonary:      Effort: Pulmonary effort is normal.      Breath sounds: Normal breath sounds.   Abdominal:      General: Bowel sounds " are normal.      Palpations: Abdomen is soft.   Musculoskeletal:         General: Normal range of motion.      Cervical back: Neck supple.   Skin:     General: Skin is warm and dry.   Neurological:      General: No focal deficit present.      Mental Status: She is alert and oriented to person, place, and time.   Psychiatric:         Behavior: Behavior normal.         Judgment: Judgment normal.         Assessment/Plan   Problem List Items Addressed This Visit          Respiratory    Recurrent cough    Relevant Medications    montelukast (Singulair) 10 mg tablet    loratadine (Claritin) 10 mg tablet    benzonatate (Tessalon) 100 mg capsule    fluticasone propion-salmeteroL (Advair Diskus) 500-50 mcg/dose diskus inhaler     Other Visit Diagnoses       Sinus headache    -  Primary    Relevant Medications    cefdinir (Omnicef) 300 mg capsule    Ear pain, left        Relevant Medications    ofloxacin (Floxin) 0.3 % otic solution

## 2023-03-13 NOTE — PATIENT INSTRUCTIONS
Please consider exercise program involving walking or some other form of aerobic activity 5 days weekly for 30 minutes... Let's also consider strengthening of large muscle groups like the abdominal muscles or shoulder muscles... Twice weekly with reps of 5/10/15 exercises and gradually increase strength.. This is not heavy strength training but light weight training... Sit ups or back exercise routine.. Please ask for handout if uncertain how to do..This  will help to strengthen your muscles which in turn will help you to lose weight.... You might ask what is the best diet available.. I would strongly encourage you to consider  Weight Watchers.. And as  your  fellow on  Weight Watchers physician attempting to  live this  LIFE  style  choice with you....  I will be glad to give you recommendations on what to eat.. Consider buying Celina bread.., michell bagle thin bread.. oikos yogurt... eggs  to eat as hard-boiled... Halo top ice cream for snack... All these are delicious foods which.. when eaten and  being compliant eating three  meals daily  breakfast lunch and dinner, drinking  64 ounces of water daily we will all win together !!!!!!!. This will be a means for you to lose weight... Consider also the smart phone reddy ... My plate.. Or My  fitness  pal..,  as additional possibilities for weight loss... Good  lucrenata Silva!    Discussed medication side effects.  The  risk benefits and treatment options  discussed with patient.  Better or so we added his name at    Please schedule follow-up appointment based upon your improvement/failure to improve/chronic medical conditions and physician recommendations during office appointment at the .  For lesion, open ended    Patient advised to go to er if symptoms worsen or to call answering service, or to return to office for additional evaluation    This note was partially  generated using Dragon voice recognition and there may be incorrect words, wording,  spelling, or pronunciation errors that were not corrected prior to committing the note to the medical record.

## 2023-04-17 ENCOUNTER — TELEPHONE (OUTPATIENT)
Dept: PRIMARY CARE | Facility: CLINIC | Age: 63
End: 2023-04-17
Payer: COMMERCIAL

## 2023-04-17 NOTE — TELEPHONE ENCOUNTER
Pt needs name of Hematologist in regards to her bleeding disorder. 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  Dr. Cho did give her referral but she has no clue where to go. She is asking you Please help her so she can get surgery

## 2023-04-18 ENCOUNTER — TELEPHONE (OUTPATIENT)
Dept: PRIMARY CARE | Facility: CLINIC | Age: 63
End: 2023-04-18
Payer: COMMERCIAL

## 2023-04-18 NOTE — TELEPHONE ENCOUNTER
Pt wants to know if there is a Dx for her Blood Disorder. She states the Sinus Issues you have been treating her for is Actually a Large tumor and  wants to know if she has seen a Hematologist   ASA    (Pt has called yesterday and today)  Please Advise

## 2023-05-17 ENCOUNTER — TELEPHONE (OUTPATIENT)
Dept: PRIMARY CARE | Facility: CLINIC | Age: 63
End: 2023-05-17
Payer: COMMERCIAL

## 2023-05-17 NOTE — TELEPHONE ENCOUNTER
Patient has ENT referral. Has seen both Dr. Nam and Dr. Cho. She does not care for Dr. Cho, and Dr. Nam told her that her tumor is much larger than his typical operations.     She would like another name of an ENT physician you would recommend either in, or outside of  system. Please advise. Thanks!

## 2023-06-22 LAB
ANION GAP IN SER/PLAS: 16 MMOL/L (ref 10–20)
CALCIUM (MG/DL) IN SER/PLAS: 9.4 MG/DL (ref 8.6–10.3)
CARBON DIOXIDE, TOTAL (MMOL/L) IN SER/PLAS: 24 MMOL/L (ref 21–32)
CHLORIDE (MMOL/L) IN SER/PLAS: 104 MMOL/L (ref 98–107)
CREATININE (MG/DL) IN SER/PLAS: 0.95 MG/DL (ref 0.5–1.05)
ERYTHROCYTE DISTRIBUTION WIDTH (RATIO) BY AUTOMATED COUNT: 13.3 % (ref 11.5–14.5)
ERYTHROCYTE MEAN CORPUSCULAR HEMOGLOBIN CONCENTRATION (G/DL) BY AUTOMATED: 32.3 G/DL (ref 32–36)
ERYTHROCYTE MEAN CORPUSCULAR VOLUME (FL) BY AUTOMATED COUNT: 87 FL (ref 80–100)
ERYTHROCYTES (10*6/UL) IN BLOOD BY AUTOMATED COUNT: 4.99 X10E12/L (ref 4–5.2)
GFR FEMALE: 68 ML/MIN/1.73M2
GLUCOSE (MG/DL) IN SER/PLAS: 94 MG/DL (ref 74–99)
HEMATOCRIT (%) IN BLOOD BY AUTOMATED COUNT: 43.3 % (ref 36–46)
HEMOGLOBIN (G/DL) IN BLOOD: 14 G/DL (ref 12–16)
INR IN PPP BY COAGULATION ASSAY: 1 (ref 0.9–1.1)
LEUKOCYTES (10*3/UL) IN BLOOD BY AUTOMATED COUNT: 5.8 X10E9/L (ref 4.4–11.3)
PLATELETS (10*3/UL) IN BLOOD AUTOMATED COUNT: 273 X10E9/L (ref 150–450)
POTASSIUM (MMOL/L) IN SER/PLAS: 3.6 MMOL/L (ref 3.5–5.3)
PROTHROMBIN TIME (PT) IN PPP BY COAGULATION ASSAY: 11.3 SEC (ref 9.8–12.8)
SODIUM (MMOL/L) IN SER/PLAS: 140 MMOL/L (ref 136–145)
UREA NITROGEN (MG/DL) IN SER/PLAS: 13 MG/DL (ref 6–23)

## 2023-06-30 ENCOUNTER — HOSPITAL ENCOUNTER (OUTPATIENT)
Dept: DATA CONVERSION | Facility: HOSPITAL | Age: 63
End: 2023-06-30
Attending: OTOLARYNGOLOGY | Admitting: OTOLARYNGOLOGY
Payer: COMMERCIAL

## 2023-06-30 DIAGNOSIS — I10 ESSENTIAL (PRIMARY) HYPERTENSION: ICD-10-CM

## 2023-06-30 DIAGNOSIS — J44.9 CHRONIC OBSTRUCTIVE PULMONARY DISEASE, UNSPECIFIED (MULTI): ICD-10-CM

## 2023-06-30 DIAGNOSIS — Z79.51 LONG TERM (CURRENT) USE OF INHALED STEROIDS: ICD-10-CM

## 2023-06-30 DIAGNOSIS — J32.0 CHRONIC MAXILLARY SINUSITIS: ICD-10-CM

## 2023-06-30 DIAGNOSIS — J34.89 OTHER SPECIFIED DISORDERS OF NOSE AND NASAL SINUSES: ICD-10-CM

## 2023-06-30 DIAGNOSIS — K21.9 GASTRO-ESOPHAGEAL REFLUX DISEASE WITHOUT ESOPHAGITIS: ICD-10-CM

## 2023-06-30 DIAGNOSIS — Z91.040 LATEX ALLERGY STATUS: ICD-10-CM

## 2023-07-07 LAB
COMPLETE PATHOLOGY REPORT: NORMAL
CONVERTED CLINICAL DIAGNOSIS-HISTORY: NORMAL
CONVERTED FINAL DIAGNOSIS: NORMAL
CONVERTED FINAL REPORT PDF LINK TO COPY AND PASTE: NORMAL
CONVERTED GROSS DESCRIPTION: NORMAL

## 2023-09-29 VITALS
DIASTOLIC BLOOD PRESSURE: 71 MMHG | SYSTOLIC BLOOD PRESSURE: 149 MMHG | RESPIRATION RATE: 16 BRPM | TEMPERATURE: 97.5 F | HEART RATE: 64 BPM

## 2023-09-30 NOTE — H&P
History of Present Illness:   History Present Illness:  Reason for surgery: Left nasal cavity lesion   HPI:    This patient is a 62-year-old female who presented to outpatient ENT clinic for nasal complaints, found to have a lesion of her left nasal cavity. CT scan was performed  and reviewed. Options were discussed with the patient, including exam under anesthesia and biopsy of lesion. Therefore, given the above, decision was made to proceed to the operating room for the below listed procedures. The risks, benefits, and alternatives  of surgery were discussed extensively in the outpatient setting. Please see outpatient clinic note for complete details of the discussion.  There have otherwise been no major changes to the patient's medical status since the previous outpatient surgical  visit. No new complaints or relevant history to note today. All questions were answered. Informed consent was obtained/confirmed.     Allergies:        Allergies:  ·  codeine : Unknown  ·  Latex : Swelling/Edema, Rash  ·  Tape  - Adhesive, Bandaids, Paper: Swelling/Edema, Rash    Home Medication Review:   Home Medications Reviewed: yes     Impression/Procedure:   ·  Impression and Planned Procedure: Nasal endoscopy, bilateral  Biopsy of nasal cavity lesion       ERAS (Enhanced Recovery After Surgery):  ·  ERAS Patient: no       Vital Signs:  Temperature C: 36.4 degrees C   Temperature F: 97.5 degrees F   Heart Rate: 64 beats per minute   Respiratory Rate: 16 breath per minute   Blood Pressure Systolic: 149 mm/Hg   Blood Pressure Diastolic: 71 mm/Hg     Physical Exam by System:    Constitutional: Well developed, awake/alert/oriented  x3, no distress, alert and cooperative   Eyes: Sclera nonicteric.  EOMI   Head/Neck: Neck supple, no apparent injury, No JVD,  trachea midline   Respiratory/Thorax: Good chest expansion, thorax  symmetric.  Breathing unlabored   Cardiovascular: No signs of peripheral edema or cyanosis   Extremities:  Moving all extremities spontaneously.   No evidence of clubbing or contracture   Neurological: alert and oriented x3, intact senses,  normal strength   Psychological: Appropriate mood and behavior   Skin: Warm and dry, no lesions, no rashes     Consent:   COVID-19 Consent:  ·  COVID-19 Risk Consent Surgeon has reviewed key risks related to the risk of tomer COVID-19 and if they contract COVID-19 what the risks are.     Attestation:   Note Completion:  I am a:  Resident/Fellow   Attending Attestation I saw and evaluated the patient.  I personally obtained the key and critical portions of the history and physical exam or was physically present for key and  critical portions performed by the resident/fellow. I reviewed the resident/fellow?s documentation and discussed the patient with the resident/fellow.  I agree with the resident/fellow?s medical decision making as documented in the note.     I personally evaluated the patient on 30-Jun-2023         Electronic Signatures:  Barry Cutler (Resident))  (Signed 30-Jun-2023 07:06)   Authored: History of Present Illness, Allergies, Home  Medication Review, Impression/Procedure, ERAS, Physical Exam, Consent, Note Completion  Reed Cho)  (Signed 05-Jul-2023 07:13)   Authored: Physical Exam, Note Completion   Co-Signer: History of Present Illness, Allergies, Home Medication Review, Impression/Procedure, ERAS, Physical Exam, Consent, Note Completion      Last Updated: 05-Jul-2023 07:13 by Reed Cho)

## 2023-10-02 NOTE — OP NOTE
PROCEDURE DETAILS    Preoperative Diagnosis:  Chronic maxillary sinusitis, left  Nasal cavity mass  Postoperative Diagnosis:  Chronic maxillary sinusitis, left  Nasal cavity mass  Surgeon: Reed Cho MD  Resident/Fellow/Other Assistant: Barry Culter MD    Procedure:  1. Nasal endoscopy with removal of mass, left inferior meatus  2. Maxillary antrostomy with removal of tissue, left  Anesthesia: GET  Estimated Blood Loss: 20  Findings: 1. Fungal debris occupying left inferior meatus cleared, biopsy obtained from inferior meatus mucosa  2. Postoperative changes to left maxillary sinus  Specimens(s) Collected: yes,  Left inferior meatus   Complications: None  Implants: None  Patient Returned To/Condition: PACU/Satisfactory                                Attestation:   Note Completion:  Attending Attestation I was present for the entire procedure    I am a: Resident/Fellow         Electronic Signatures:  Barry Cutler (Resident))  (Signed 30-Jun-2023 10:16)   Authored: Post-Operative Note, Chart Review, Note Completion  Reed Cho)  (Signed 08-Jul-2023 14:17)   Authored: Post-Operative Note, Chart Review, Note Completion   Co-Signer: Post-Operative Note, Chart Review, Note Completion      Last Updated: 08-Jul-2023 14:17 by Reed Cho)

## 2023-10-05 ENCOUNTER — ANCILLARY PROCEDURE (OUTPATIENT)
Dept: CARDIOLOGY | Facility: CLINIC | Age: 63
End: 2023-10-05
Payer: COMMERCIAL

## 2023-10-05 DIAGNOSIS — I47.10 SUPRAVENTRICULAR TACHYCARDIA, UNSPECIFIED (CMS-HCC): ICD-10-CM

## 2023-10-05 DIAGNOSIS — I49.5 SICK SINUS SYNDROME (MULTI): ICD-10-CM

## 2023-10-05 DIAGNOSIS — R00.2 PALPITATIONS: ICD-10-CM

## 2023-10-05 DIAGNOSIS — R00.0 TACHYCARDIA, UNSPECIFIED: ICD-10-CM

## 2023-10-05 PROCEDURE — 93224 XTRNL ECG REC UP TO 48 HRS: CPT | Performed by: INTERNAL MEDICINE

## 2023-10-30 ENCOUNTER — LAB (OUTPATIENT)
Dept: LAB | Facility: LAB | Age: 63
End: 2023-10-30
Payer: COMMERCIAL

## 2023-10-30 DIAGNOSIS — R00.2 PALPITATIONS: Primary | ICD-10-CM

## 2023-10-30 DIAGNOSIS — I51.7 CARDIOMEGALY: ICD-10-CM

## 2023-10-30 DIAGNOSIS — R53.83 OTHER FATIGUE: ICD-10-CM

## 2023-10-30 DIAGNOSIS — I10 ESSENTIAL (PRIMARY) HYPERTENSION: ICD-10-CM

## 2023-10-30 LAB
ALBUMIN SERPL BCP-MCNC: 4 G/DL (ref 3.4–5)
ALP SERPL-CCNC: 55 U/L (ref 33–136)
ALT SERPL W P-5'-P-CCNC: 9 U/L (ref 7–45)
ANION GAP SERPL CALC-SCNC: 14 MMOL/L (ref 10–20)
AST SERPL W P-5'-P-CCNC: 10 U/L (ref 9–39)
BILIRUB DIRECT SERPL-MCNC: 0.1 MG/DL (ref 0–0.3)
BILIRUB SERPL-MCNC: 0.6 MG/DL (ref 0–1.2)
BUN SERPL-MCNC: 17 MG/DL (ref 6–23)
CALCIUM SERPL-MCNC: 9.4 MG/DL (ref 8.6–10.3)
CHLORIDE SERPL-SCNC: 105 MMOL/L (ref 98–107)
CHOLEST SERPL-MCNC: 193 MG/DL (ref 0–199)
CHOLESTEROL/HDL RATIO: 4.5
CO2 SERPL-SCNC: 26 MMOL/L (ref 21–32)
CREAT SERPL-MCNC: 0.87 MG/DL (ref 0.5–1.05)
ERYTHROCYTE [DISTWIDTH] IN BLOOD BY AUTOMATED COUNT: 13.4 % (ref 11.5–14.5)
GFR SERPL CREATININE-BSD FRML MDRD: 76 ML/MIN/1.73M*2
GLUCOSE SERPL-MCNC: 99 MG/DL (ref 74–99)
HCT VFR BLD AUTO: 42.8 % (ref 36–46)
HDLC SERPL-MCNC: 42.9 MG/DL
HGB BLD-MCNC: 13.8 G/DL (ref 12–16)
LDLC SERPL CALC-MCNC: 121 MG/DL
MAGNESIUM SERPL-MCNC: 1.79 MG/DL (ref 1.6–2.4)
MCH RBC QN AUTO: 27.9 PG (ref 26–34)
MCHC RBC AUTO-ENTMCNC: 32.2 G/DL (ref 32–36)
MCV RBC AUTO: 87 FL (ref 80–100)
NON HDL CHOLESTEROL: 150 MG/DL (ref 0–149)
NRBC BLD-RTO: 0 /100 WBCS (ref 0–0)
PLATELET # BLD AUTO: 304 X10*3/UL (ref 150–450)
PMV BLD AUTO: 10.5 FL (ref 7.5–11.5)
POTASSIUM SERPL-SCNC: 3.8 MMOL/L (ref 3.5–5.3)
PROT SERPL-MCNC: 6.1 G/DL (ref 6.4–8.2)
RBC # BLD AUTO: 4.94 X10*6/UL (ref 4–5.2)
SODIUM SERPL-SCNC: 141 MMOL/L (ref 136–145)
TRIGL SERPL-MCNC: 144 MG/DL (ref 0–149)
TSH SERPL-ACNC: 3.61 MIU/L (ref 0.44–3.98)
VLDL: 29 MG/DL (ref 0–40)
WBC # BLD AUTO: 5.8 X10*3/UL (ref 4.4–11.3)

## 2023-10-30 PROCEDURE — 80061 LIPID PANEL: CPT

## 2023-10-30 PROCEDURE — 85027 COMPLETE CBC AUTOMATED: CPT

## 2023-10-30 PROCEDURE — 82248 BILIRUBIN DIRECT: CPT

## 2023-10-30 PROCEDURE — 83735 ASSAY OF MAGNESIUM: CPT

## 2023-10-30 PROCEDURE — 36415 COLL VENOUS BLD VENIPUNCTURE: CPT

## 2023-10-30 PROCEDURE — 84443 ASSAY THYROID STIM HORMONE: CPT

## 2023-10-30 PROCEDURE — 80053 COMPREHEN METABOLIC PANEL: CPT

## 2023-11-01 ENCOUNTER — APPOINTMENT (OUTPATIENT)
Dept: OTOLARYNGOLOGY | Facility: CLINIC | Age: 63
End: 2023-11-01
Payer: COMMERCIAL

## 2023-11-01 ENCOUNTER — OFFICE VISIT (OUTPATIENT)
Dept: CARDIOLOGY | Facility: CLINIC | Age: 63
End: 2023-11-01
Payer: COMMERCIAL

## 2023-11-01 VITALS
SYSTOLIC BLOOD PRESSURE: 162 MMHG | HEART RATE: 82 BPM | DIASTOLIC BLOOD PRESSURE: 87 MMHG | WEIGHT: 125 LBS | HEIGHT: 63 IN | BODY MASS INDEX: 22.15 KG/M2

## 2023-11-01 DIAGNOSIS — I51.7 LVH (LEFT VENTRICULAR HYPERTROPHY): ICD-10-CM

## 2023-11-01 DIAGNOSIS — R00.0 TACHYCARDIA: Primary | ICD-10-CM

## 2023-11-01 DIAGNOSIS — I47.10 PSVT (PAROXYSMAL SUPRAVENTRICULAR TACHYCARDIA) (CMS-HCC): ICD-10-CM

## 2023-11-01 DIAGNOSIS — I49.3 PVC (PREMATURE VENTRICULAR CONTRACTION): ICD-10-CM

## 2023-11-01 DIAGNOSIS — R07.9 CHEST PAIN, UNSPECIFIED TYPE: ICD-10-CM

## 2023-11-01 DIAGNOSIS — I45.10 INCOMPLETE RBBB: ICD-10-CM

## 2023-11-01 DIAGNOSIS — I31.39 PERICARDIAL EFFUSION (HHS-HCC): ICD-10-CM

## 2023-11-01 DIAGNOSIS — F41.9 ANXIETY: ICD-10-CM

## 2023-11-01 DIAGNOSIS — R94.31 ABNORMAL ECG: ICD-10-CM

## 2023-11-01 DIAGNOSIS — J44.9 CHRONIC OBSTRUCTIVE PULMONARY DISEASE, UNSPECIFIED COPD TYPE (MULTI): ICD-10-CM

## 2023-11-01 DIAGNOSIS — R53.83 FATIGUE, UNSPECIFIED TYPE: ICD-10-CM

## 2023-11-01 DIAGNOSIS — I10 HYPERTENSION, UNSPECIFIED TYPE: ICD-10-CM

## 2023-11-01 DIAGNOSIS — R00.2 PALPITATIONS: ICD-10-CM

## 2023-11-01 DIAGNOSIS — I49.9 CARDIAC ARRHYTHMIA, UNSPECIFIED CARDIAC ARRHYTHMIA TYPE: ICD-10-CM

## 2023-11-01 DIAGNOSIS — I49.1 APC (ATRIAL PREMATURE CONTRACTIONS): ICD-10-CM

## 2023-11-01 PROCEDURE — 1036F TOBACCO NON-USER: CPT | Performed by: INTERNAL MEDICINE

## 2023-11-01 PROCEDURE — 3077F SYST BP >= 140 MM HG: CPT | Performed by: INTERNAL MEDICINE

## 2023-11-01 PROCEDURE — 99214 OFFICE O/P EST MOD 30 MIN: CPT | Performed by: INTERNAL MEDICINE

## 2023-11-01 PROCEDURE — 3079F DIAST BP 80-89 MM HG: CPT | Performed by: INTERNAL MEDICINE

## 2023-11-01 RX ORDER — LOSARTAN POTASSIUM 25 MG/1
50 TABLET ORAL 2 TIMES DAILY
Qty: 120 TABLET | Refills: 11 | Status: SHIPPED | OUTPATIENT
Start: 2023-11-01 | End: 2024-10-31

## 2023-11-01 NOTE — PROGRESS NOTES
CARDIOLOGY OFFICE NOTE    Date:   11/1/2023    Patient:    Good Appiah    YOB: 1960    Primary Physician: Jamar Silva DO       Reason for Visit: Follow up visit      HPI:     Good Appiah was seen in cardiac evaluation at the  Cardiology office November 1, 2023.      The patients problems are listed as in the impression below.    Electronic medical records reviewed.    Patient returns a.  She overall feels well.  She states that she is somewhat stressed out.  She has a lot of family home issues currently.  She does note her heart palpating.  She has no exertional symptoms.  She has no cardiovascular complaints otherwise.    Patient denies Chest Pain, SOB, Lightheadedness, Dizziness, TIA or CVA symptoms.  No CHF or Edema.  No GI,  or Bleeding Issues. No Recent Fever or Chills.     Cardiovascular and general review of systems is otherwise negative.    A 14-system review is otherwise negative, other than noted.     PHYSICAL EXAMINATION:      Vitals:    11/01/23 1607   BP: 162/87   Pulse: 82     General: No acute distress. Vital signs as noted. Alert and oriented.  Head And Neck Examination: No jugular venous distention, no carotid bruits, no mass. Carotid upstrokes preserved. Oral mucosa moist. No xanthelasma. Head and neck examination otherwise unremarkable.  Lungs: Clear to auscultation and percussion. No wheezes, no rales, and no rhonchi.  Chest: Excursion appeared to be normal. No chest wall tenderness on palpation.  Heart: Normal S1 and S2. No S3. No S4. No rub. Grade 1/6 systolic murmur, best heard at the left sternal border. Point of maximal impulse was within normal limits.  Abdomen: Soft. Nontender. No organomegaly. No bruits. No masses.  Extremities: No bipedal edema. No clubbing. No cyanosis. Pulses are strong throughout. No bruits.  Musculoskeletal Exam: No ulcers, otherwise unremarkable.  Neuro: Neurologically appeared grossly intact.  .  IMPRESSION:       Palpitations  Tachycardia / bradycardia  PSVT  Hypertension abnormal ECG,   Negative stress echocardiogram 2022,   Incomplete right bundle branch block  Left ventricular hypertrophy.  Left ventricular function ejection fraction 60-65% 2022.  Atrial and ventricular ectopy, ventricular bigeminy by 48-hour Holter monitor 2022.   Negative CT calcium scorin, 2022.  Prior normal LV systolic function ejection fraction 65%, echocardiogram   Negative Myoview perfusion study   History of large pericardial effusion post drainage   COPD  Asthma  Anxiety  Gastric reflux disease  Esophageal strictures post dilatation  Prior history of bleeding disorder  Family history of CAD  Otherwise as per assessment below.    RECOMMENDATIONS:      Patient is doing well.  No symptoms currently except for palpitations.  Her blood pressure is up a bit.  Would suggest following medication adjustments: Increase Toprol-XL to 100 mg daily.  Start losartan 50 mg half tablet twice daily.  We will reserve Norvasc as needed.  She will continue her other medications.  Refills were provided.    Exercise dietary program.  Hydration.    [employer] portal use was encouraged.    We will plan to see back in 1 month with Laboratory Studies and ECG as ordered.     Patient will follow up with their primary physician for general care.    The patient knows to contact medical care earlier if need be.      ALLERGIES:     Allergies   Allergen Reactions    Aspirin Unknown    Azithromycin Itching    Codeine Unknown    Oxycodone Unknown      MEDICATIONS:     Current Outpatient Medications   Medication Instructions    albuterol 90 mcg/actuation inhaler 1 puff, inhalation, Every 4 hours PRN    albuterol 2.5 mg, inhalation, USE 1 UNIT DOSE EVERY 4-6 HOURS PRN FOR WHEEZING    benzonatate (TESSALON) 100 mg, oral, As needed    budesonide-formoteroL (Symbicort) 80-4.5 mcg/actuation inhaler 2 puffs, inhalation, 2 times daily, RINSE MOUTH AFTER USE     fluticasone propion-salmeteroL (Advair Diskus) 500-50 mcg/dose diskus inhaler 1 puff, inhalation, 2 times daily RT, Rinse mouth with water after use to reduce aftertaste and incidence of candidiasis. Do not swallow.    loratadine (CLARITIN) 10 mg, oral, Daily    metoprolol succinate XL (TOPROL-XL) 150 mg, oral, Daily    montelukast (SINGULAIR) 10 mg, oral, Daily    pantoprazole (PROTONIX) 40 mg, oral, Daily    spironolactone (ALDACTONE) 50 mg, oral, Daily     ELECTROCARDIOGRAM:      None    CARDIAC TESTIN-hour Holter monitor: Sinus rhythm.  Normal heart rate variability.  Atrial and ventricular isolated PVCs occasionally.  No other significant dysrhythmias.  No high-grade AV blocks or pauses.    LABORATORY DATA:      CBC:   Lab Results   Component Value Date    WBC 5.8 10/30/2023    RBC 4.94 10/30/2023    HGB 13.8 10/30/2023    HCT 42.8 10/30/2023     10/30/2023        CMP:    Lab Results   Component Value Date     10/30/2023    K 3.8 10/30/2023     10/30/2023    CO2 26 10/30/2023    BUN 17 10/30/2023    CREATININE 0.87 10/30/2023    GLUCOSE 99 10/30/2023    CALCIUM 9.4 10/30/2023       Magnesium:    Lab Results   Component Value Date    MG 1.79 10/30/2023       Lipid Profile:    Lab Results   Component Value Date    TRIG 144 10/30/2023    HDL 42.9 10/30/2023    LDLCALC 121 (H) 10/30/2023       Hepatic Function Panel:    Lab Results   Component Value Date    ALKPHOS 55 10/30/2023    ALT 9 10/30/2023    AST 10 10/30/2023    PROT 6.1 (L) 10/30/2023    BILITOT 0.6 10/30/2023    BILIDIR 0.1 10/30/2023       TSH:    Lab Results   Component Value Date    TSH 3.61 10/30/2023     PT/INR:    Lab Results   Component Value Date    PROTIME 11.3 2023    INR 1.0 2023       PROBLEM LIST:     Patient Active Problem List   Diagnosis    Abdominal pain, right lateral    Abnormal ECG    Acute sinusitis    Allergic rhinitis    Anxiety    APC (atrial premature contractions)    Arrhythmia     Asthmatic bronchitis    Bleeding disorder (CMS/HCC)    Chest pain    Concussion    COPD (chronic obstructive pulmonary disease) (CMS/HCC)    Cyst of left kidney    Dysphagia    Fatigue    General weakness    Headache    HTN (hypertension)    Incomplete RBBB    Left wrist pain    Leg edema    Lightheadedness    Lung nodule    LVH (left ventricular hypertrophy)    Palpitations    Pericardial effusion    PSVT (paroxysmal supraventricular tachycardia)    PVC (premature ventricular contraction)    Recurrent cough    Tachycardia    PTSD (post-traumatic stress disorder)    Esophageal stricture             Chan Whiting MD, [facility]   [facility] / NO /  Cardiology      Of Note:  [employer] voice recognition dictation software was utilized partially in the preparation of this note, therefore, inaccuracies in spelling, word choice and punctuation may have occurred which were not recognized the time of signing.    ----

## 2023-11-28 DIAGNOSIS — J45.909 ASTHMA, UNSPECIFIED ASTHMA SEVERITY, UNSPECIFIED WHETHER COMPLICATED, UNSPECIFIED WHETHER PERSISTENT (HHS-HCC): ICD-10-CM

## 2023-11-28 DIAGNOSIS — J44.9 CHRONIC OBSTRUCTIVE PULMONARY DISEASE, UNSPECIFIED COPD TYPE (MULTI): ICD-10-CM

## 2023-11-28 RX ORDER — BUDESONIDE AND FORMOTEROL FUMARATE DIHYDRATE 80; 4.5 UG/1; UG/1
2 AEROSOL RESPIRATORY (INHALATION) 2 TIMES DAILY
Qty: 3 EACH | Refills: 3 | OUTPATIENT
Start: 2023-11-28

## 2023-11-28 RX ORDER — ALBUTEROL SULFATE 90 UG/1
1 AEROSOL, METERED RESPIRATORY (INHALATION) EVERY 4 HOURS PRN
Qty: 18 G | Refills: 3 | OUTPATIENT
Start: 2023-11-28

## 2023-11-29 DIAGNOSIS — J44.9 CHRONIC OBSTRUCTIVE PULMONARY DISEASE, UNSPECIFIED COPD TYPE (MULTI): Primary | ICD-10-CM

## 2023-11-29 RX ORDER — ALBUTEROL SULFATE 90 UG/1
2 AEROSOL, METERED RESPIRATORY (INHALATION) EVERY 4 HOURS PRN
Qty: 18 G | Refills: 2 | Status: SHIPPED | OUTPATIENT
Start: 2023-11-29 | End: 2023-12-29

## 2023-11-29 RX ORDER — BUDESONIDE AND FORMOTEROL FUMARATE DIHYDRATE 80; 4.5 UG/1; UG/1
2 AEROSOL RESPIRATORY (INHALATION) 2 TIMES DAILY
Qty: 1 EACH | Refills: 3 | Status: SHIPPED | OUTPATIENT
Start: 2023-11-29

## 2023-12-13 ENCOUNTER — OFFICE VISIT (OUTPATIENT)
Dept: CARDIOLOGY | Facility: CLINIC | Age: 63
End: 2023-12-13
Payer: COMMERCIAL

## 2023-12-13 VITALS — BODY MASS INDEX: 22 KG/M2 | WEIGHT: 124.2 LBS | DIASTOLIC BLOOD PRESSURE: 74 MMHG | SYSTOLIC BLOOD PRESSURE: 122 MMHG

## 2023-12-13 DIAGNOSIS — J44.9 CHRONIC OBSTRUCTIVE PULMONARY DISEASE, UNSPECIFIED COPD TYPE (MULTI): ICD-10-CM

## 2023-12-13 DIAGNOSIS — I47.10 PSVT (PAROXYSMAL SUPRAVENTRICULAR TACHYCARDIA) (CMS-HCC): ICD-10-CM

## 2023-12-13 DIAGNOSIS — R53.83 FATIGUE, UNSPECIFIED TYPE: ICD-10-CM

## 2023-12-13 DIAGNOSIS — I49.9 CARDIAC ARRHYTHMIA, UNSPECIFIED CARDIAC ARRHYTHMIA TYPE: ICD-10-CM

## 2023-12-13 DIAGNOSIS — I49.3 PVC (PREMATURE VENTRICULAR CONTRACTION): ICD-10-CM

## 2023-12-13 DIAGNOSIS — I31.39 PERICARDIAL EFFUSION (HHS-HCC): ICD-10-CM

## 2023-12-13 DIAGNOSIS — I45.10 INCOMPLETE RBBB: ICD-10-CM

## 2023-12-13 DIAGNOSIS — R60.0 LEG EDEMA: ICD-10-CM

## 2023-12-13 DIAGNOSIS — R07.9 CHEST PAIN, UNSPECIFIED TYPE: ICD-10-CM

## 2023-12-13 DIAGNOSIS — R00.0 TACHYCARDIA: Primary | ICD-10-CM

## 2023-12-13 DIAGNOSIS — R00.2 PALPITATIONS: ICD-10-CM

## 2023-12-13 DIAGNOSIS — I49.1 APC (ATRIAL PREMATURE CONTRACTIONS): ICD-10-CM

## 2023-12-13 DIAGNOSIS — I10 HYPERTENSION, UNSPECIFIED TYPE: ICD-10-CM

## 2023-12-13 DIAGNOSIS — I51.7 LVH (LEFT VENTRICULAR HYPERTROPHY): ICD-10-CM

## 2023-12-13 DIAGNOSIS — I10 PRIMARY HYPERTENSION: ICD-10-CM

## 2023-12-13 DIAGNOSIS — R94.31 ABNORMAL ECG: ICD-10-CM

## 2023-12-13 DIAGNOSIS — F41.9 ANXIETY: ICD-10-CM

## 2023-12-13 PROCEDURE — 93000 ELECTROCARDIOGRAM COMPLETE: CPT | Performed by: INTERNAL MEDICINE

## 2023-12-13 PROCEDURE — 3078F DIAST BP <80 MM HG: CPT | Performed by: INTERNAL MEDICINE

## 2023-12-13 PROCEDURE — 3074F SYST BP LT 130 MM HG: CPT | Performed by: INTERNAL MEDICINE

## 2023-12-13 PROCEDURE — 1036F TOBACCO NON-USER: CPT | Performed by: INTERNAL MEDICINE

## 2023-12-13 PROCEDURE — 99214 OFFICE O/P EST MOD 30 MIN: CPT | Performed by: INTERNAL MEDICINE

## 2023-12-13 RX ORDER — SPIRONOLACTONE 25 MG/1
50 TABLET ORAL DAILY
Qty: 180 TABLET | Refills: 3 | Status: SHIPPED | OUTPATIENT
Start: 2023-12-13 | End: 2024-12-12

## 2023-12-13 RX ORDER — METOPROLOL SUCCINATE 100 MG/1
150 TABLET, EXTENDED RELEASE ORAL DAILY
Qty: 135 TABLET | Refills: 3 | Status: SHIPPED | OUTPATIENT
Start: 2023-12-13 | End: 2024-12-12

## 2023-12-13 NOTE — PROGRESS NOTES
CARDIOLOGY OFFICE NOTE    Date:   12/13/2023    Patient:    Good Appiah    YOB: 1960    Primary Physician: Jamar Silva DO       Reason for Visit: 1 month follow-up    HPI:     Good Appiah was seen in cardiac evaluation at the  Cardiology office December 13, 2023.      The patients problems are listed as in the impression below.    Electronic medical records reviewed.    Patient returns.  She states that she is still having palpitations.  She states that her blood pressure is been for the most part at home in the 130 systolic range.  She does note that her heart rates are high and low.  She feels poorly when they are at either range.  Today she currently is feeling well.    Her last Holter monitor did not reveal any significant dysrhythmias.    She has no other cardiovascular complaints.    Patient denies Chest Pain, SOB, Lightheadedness, Dizziness, TIA or CVA symptoms.  No CHF or Edema.  No Palpitations.  No GI,  or Bleeding Issues. No Recent Fever or Chills.     Cardiovascular and general review of systems is otherwise negative.    A 14-system review is otherwise negative, other than noted.     PHYSICAL EXAMINATION:      Vitals:    12/13/23 1539   BP: 122/74     General: No acute distress. Vital signs as noted. Alert and oriented.  Head And Neck Examination: No jugular venous distention, no carotid bruits, no mass. Carotid upstrokes preserved. Oral mucosa moist. No xanthelasma. Head and neck examination otherwise unremarkable.  Lungs: Clear to auscultation and percussion. No wheezes, no rales, and no rhonchi.  Chest: Excursion appeared to be normal. No chest wall tenderness on palpation.  Heart: Normal S1 and S2. No S3. No S4. No rub. Grade 1/6 systolic murmur, best heard at the left sternal border. Point of maximal impulse was within normal limits.  Abdomen: Soft. Nontender. No organomegaly. No bruits. No masses.  Extremities: No bipedal edema. No clubbing. No cyanosis.  Pulses are strong throughout. No bruits.  Musculoskeletal Exam: No ulcers, otherwise unremarkable.  Neuro: Neurologically appeared grossly intact.  .  IMPRESSION:       Palpitations  Tachycardia / bradycardia  PSVT  Hypertension abnormal ECG,   Negative stress echocardiogram 2022,   Incomplete right bundle branch block  Left ventricular hypertrophy.  Left ventricular function ejection fraction 60-65% 2022.  Atrial and ventricular ectopy, ventricular bigeminy by 48-hour Holter monitor 2022.   Negative CT calcium scorin, 2022.  Prior normal LV systolic function ejection fraction 65%, echocardiogram   Negative Myoview perfusion study   History of large pericardial effusion post drainage   COPD  Asthma  Anxiety  Gastric reflux disease  Esophageal strictures post dilatation  Prior history of bleeding disorder  Family history of CAD  Otherwise as per assessment below.    RECOMMENDATIONS:      Overall would suggest continuing her current medications at this point.  Given her prior history of pericardial effusion and her symptomatology would suggest repeat echocardiogram and will do a 30-day [employer] event recorder.  EP evaluation may be warranted.    She will continue her current medications.  Refills were provided.    [employer] portal use was encouraged.    We will plan to see back in 2 months with echocardiogram, laboratory Studies and ECG as ordered.     Patient will follow up with their primary physician for general care.    The patient knows to contact medical care earlier if need be.      ALLERGIES:     Allergies   Allergen Reactions    Aspirin Unknown    Azithromycin Itching    Codeine Unknown    Oxycodone Unknown        MEDICATIONS:     Current Outpatient Medications   Medication Instructions    albuterol 90 mcg/actuation inhaler 2 puffs, inhalation, Every 4 hours PRN    albuterol 2.5 mg, inhalation, USE 1 UNIT DOSE EVERY 4-6 HOURS PRN FOR WHEEZING    benzonatate (TESSALON) 100 mg, oral, As needed     budesonide-formoteroL (Symbicort) 80-4.5 mcg/actuation inhaler 2 puffs, inhalation, 2 times daily, RINSE MOUTH AFTER USE    loratadine (CLARITIN) 10 mg, oral, Daily    losartan (COZAAR) 50 mg, oral, 2 times daily    metoprolol succinate XL (TOPROL-XL) 150 mg, oral, Daily    montelukast (SINGULAIR) 10 mg, oral, Daily    pantoprazole (PROTONIX) 40 mg, oral, Daily    spironolactone (ALDACTONE) 50 mg, oral, Daily       ELECTROCARDIOGRAM:      Sinus rhythm, incomplete right bundle branch block, nonspecific ST wave changes.  Poor wave anterior progression.  Rate 82.  No acute changes.    CARDIAC TESTIN-hour Holter monitor: 10/2023: Negative tracing.  No significant dysrhythmias noted.    LABORATORY DATA:      CBC:   Lab Results   Component Value Date    WBC 5.8 10/30/2023    RBC 4.94 10/30/2023    HGB 13.8 10/30/2023    HCT 42.8 10/30/2023     10/30/2023        CMP:    Lab Results   Component Value Date     10/30/2023    K 3.8 10/30/2023     10/30/2023    CO2 26 10/30/2023    BUN 17 10/30/2023    CREATININE 0.87 10/30/2023    GLUCOSE 99 10/30/2023    CALCIUM 9.4 10/30/2023       Magnesium:    Lab Results   Component Value Date    MG 1.79 10/30/2023       Lipid Profile:    Lab Results   Component Value Date    TRIG 144 10/30/2023    HDL 42.9 10/30/2023    LDLCALC 121 (H) 10/30/2023       Hepatic Function Panel:    Lab Results   Component Value Date    ALKPHOS 55 10/30/2023    ALT 9 10/30/2023    AST 10 10/30/2023    PROT 6.1 (L) 10/30/2023    BILITOT 0.6 10/30/2023    BILIDIR 0.1 10/30/2023       TSH:    Lab Results   Component Value Date    TSH 3.61 10/30/2023             PROBLEM LIST:     Patient Active Problem List   Diagnosis    Abdominal pain, right lateral    Abnormal ECG    Acute sinusitis    Allergic rhinitis    Anxiety    APC (atrial premature contractions)    Arrhythmia    Asthmatic bronchitis    Bleeding disorder (CMS/HCC)    Chest pain    Concussion    COPD (chronic obstructive  pulmonary disease) (CMS/HCC)    Cyst of left kidney    Dysphagia    Fatigue    General weakness    Headache    HTN (hypertension)    Incomplete RBBB    Left wrist pain    Leg edema    Lightheadedness    Lung nodule    LVH (left ventricular hypertrophy)    Palpitations    Pericardial effusion    PSVT (paroxysmal supraventricular tachycardia)    PVC (premature ventricular contraction)    Recurrent cough    Tachycardia    PTSD (post-traumatic stress disorder)    Esophageal stricture             Chan Whiting MD, [facility] / [facility] /  Cardiology      Of Note:  [employer] voice recognition dictation software was utilized partially in the preparation of this note, therefore, inaccuracies in spelling, word choice and punctuation may have occurred which were not recognized the time of signing.    Patient was seen and examined with total time of visit including chart preparation, rooming, and chart completion exceeding 40 minutes.      ----

## 2023-12-15 ENCOUNTER — TELEPHONE (OUTPATIENT)
Dept: CARDIOLOGY | Facility: CLINIC | Age: 63
End: 2023-12-15
Payer: COMMERCIAL

## 2023-12-15 NOTE — TELEPHONE ENCOUNTER
NO AUTH REQ FOR 30 DAY ZIOPATCH [id number] X2 PER JOSE AT [facility] CALL REF# [id number]

## 2024-04-19 NOTE — OP NOTE
PREOPERATIVE DIAGNOSIS:  1. Left nasal cavity lesion inferior to inferior turbinate.    2. Chronic rhinitis.  3. Rhinorrhea.  4. Nasal airway obstruction.  5. Chronic sinusitis.    POSTOPERATIVE DIAGNOSIS:  1. Left nasal cavity lesion inferior to inferior turbinate.    2. Chronic rhinitis.  3. Rhinorrhea.  4. Nasal airway obstruction.  5. Chronic sinusitis.    OPERATION/PROCEDURE:  1. Left maxillary endoscopy with tissue removal.  2. Resection of left inferior meatal/nasal cavity lesion.  3. Image guidance.    SURGEON:  Reed Cho MD.    ASSISTANT(S):  Dr. Barry Cutler.    ANESTHESIA:  General endotracheal anesthesia.    COMPLICATIONS:  None.    ESTIMATED BLOOD LOSS:  Approximately 20 mL.    SPECIMENS:  Left inferior meatal biopsy.    FINDINGS:  1. Lesion within left inferior meatus consistent with fungal ball and  was completely cleared.   2. Biopsies were obtained from the irritated mucosa posterior to the  fungal ball.   3. No permanent packing was placed.    INDICATIONS FOR PROCEDURE:  The patient is a 62-year-old female, who I initially met April 15,  2023, with ongoing concerns related to her nose and sinuses.  Imaging  had been obtained demonstrating a lesion inferior to the left  inferior turbinate.  She and I discussed these findings and her  ongoing symptoms and I recommended surgical intervention.  There was  a question of whether or not she had a bleeding disorder, so I asked  her to undergo workup through Hematology prior to proceeding to the  operating room, and this has been completed and we now proceeded to  the operating room for surgical intervention.     DESCRIPTION OF PROCEDURE:  After informed consent was obtained and all questions were answered,  the patient was brought to the operating room and placed supine on  the operating room table.  General anesthesia was induced by the  anesthesia staff and the patient was orally intubated.  The table was  turned 90 degrees.   Afrin-soaked pledgets were placed within the left  nasal cavity.     The CT image guidance system was brought into the field.  The CT data  was uploaded, reviewed, and a plan was finalized.  The headset was  attached to the patient.  The image guidance was registered accurate  in 3 separate orientations and was used throughout the surgical  procedure to identify critical landmarks such as the borders of the  orbit as well as the ethmoid skull base.  The patient was then  prepped and draped in a standard fashion for endonasal surgery.     Resection of the left nasal cavity lesion internal approach was  performed.  Nasal endoscopy demonstrated no pus or polyps of the  middle meatus or sphenoethmoid recess.  Within the anterior aspect of  the inferior meatus, there was mucoid debris.  The left inferior  turbinate was infractured and the mucoid debris was evacuated with  the suction.  Posterior to this was a lesion consistent with a fungal  ball that was grossly removed with a series of curette and suction.  The mucosa of the posterior inferior meatus was irritated and several  biopsies were obtained but there was nothing specifically concerning  about this mucosa such as ulceration.  Additional irrigation was  applied and hemostasis was deemed excellent and the inferior  turbinate was returned to normal positioning.     Left maxillary endoscopy with tissue removal was performed.  The left  maxillary sinus was patent posteriorly.  Residual uncinate anteriorly  within the natural drainage pathway was identified and removed, and  the natural drainage pathway was incorporated and edematous mucosa  was debrided.  Inspection of the floor of the left maxillary sinus  demonstrated edematous mucosa that was debrided, but there was no  possible fungal elements present.     This concluded the surgical procedure.  The patient was turned over  to the anesthesia staff and extubated in the operating room without  complication.  She  was transported to the postanesthesia care unit in  satisfactory condition.  No permanent or absorbable packing was  placed.     I attest Dr. Reed Cho, was present for the entirety of the  surgical procedure and participated in all key components.  There  were no intraoperative complications noted.        Reed Cho MD    DD:  07/08/2023 14:28:22 EST  DT:  07/08/2023 20:19:09 EST  DICTATION NUMBER:  [id number]  INTERNAL JOB NUMBER:  [id number]    CC:  Reed Cho MD, Fax: [phone]       Electronic Signatures:  Reed hCo) (Signed on 10-Jul-2023 08:12)   Authored   Unsigned, Draft (SYS GENERATED) (Entered on 08-Jul-2023 20:19)   Entered     Last Updated: 10-Jul-2023 08:12 by Reed Cho)

## 2024-09-19 ENCOUNTER — TELEPHONE (OUTPATIENT)
Dept: PRIMARY CARE | Facility: CLINIC | Age: 64
End: 2024-09-19
Payer: COMMERCIAL

## 2024-09-19 NOTE — TELEPHONE ENCOUNTER
Pt states she has had Severe R Hip pain   x 1 week and is requesting you order an X-ray prior to her seeing a Specialist   There's been a lot of popping and it hurts to bear weight.    Pt will need to come in prior to this or go to ER.  I will also send to Dr. Silva so he is aware of this message and if he feels differently to advise   Thanks!

## 2024-09-20 NOTE — TELEPHONE ENCOUNTER
Spoke with pt 09/20/2024 @ 0814  Pt is going to go to the center for orthopedics walk in clinic today

## 2024-09-25 ENCOUNTER — OFFICE VISIT (OUTPATIENT)
Dept: ORTHOPEDIC SURGERY | Facility: CLINIC | Age: 64
End: 2024-09-25
Payer: COMMERCIAL

## 2024-09-25 ENCOUNTER — HOSPITAL ENCOUNTER (OUTPATIENT)
Dept: RADIOLOGY | Facility: CLINIC | Age: 64
Discharge: HOME | End: 2024-09-25
Payer: COMMERCIAL

## 2024-09-25 DIAGNOSIS — M70.61 TROCHANTERIC BURSITIS OF RIGHT HIP: Primary | ICD-10-CM

## 2024-09-25 DIAGNOSIS — M25.551 RIGHT HIP PAIN: ICD-10-CM

## 2024-09-25 PROCEDURE — 73502 X-RAY EXAM HIP UNI 2-3 VIEWS: CPT | Mod: RT

## 2024-09-25 PROCEDURE — 2500000004 HC RX 250 GENERAL PHARMACY W/ HCPCS (ALT 636 FOR OP/ED): Performed by: STUDENT IN AN ORGANIZED HEALTH CARE EDUCATION/TRAINING PROGRAM

## 2024-09-25 PROCEDURE — 1036F TOBACCO NON-USER: CPT | Performed by: STUDENT IN AN ORGANIZED HEALTH CARE EDUCATION/TRAINING PROGRAM

## 2024-09-25 PROCEDURE — 99214 OFFICE O/P EST MOD 30 MIN: CPT | Mod: 25 | Performed by: STUDENT IN AN ORGANIZED HEALTH CARE EDUCATION/TRAINING PROGRAM

## 2024-09-25 PROCEDURE — 2500000005 HC RX 250 GENERAL PHARMACY W/O HCPCS: Performed by: STUDENT IN AN ORGANIZED HEALTH CARE EDUCATION/TRAINING PROGRAM

## 2024-09-25 PROCEDURE — 20610 DRAIN/INJ JOINT/BURSA W/O US: CPT | Mod: RT | Performed by: STUDENT IN AN ORGANIZED HEALTH CARE EDUCATION/TRAINING PROGRAM

## 2024-09-25 PROCEDURE — 99204 OFFICE O/P NEW MOD 45 MIN: CPT | Performed by: STUDENT IN AN ORGANIZED HEALTH CARE EDUCATION/TRAINING PROGRAM

## 2024-09-25 RX ORDER — LIDOCAINE HYDROCHLORIDE 10 MG/ML
4 INJECTION, SOLUTION INFILTRATION; PERINEURAL
Status: COMPLETED | OUTPATIENT
Start: 2024-09-25 | End: 2024-09-25

## 2024-09-25 RX ORDER — TRIAMCINOLONE ACETONIDE 40 MG/ML
40 INJECTION, SUSPENSION INTRA-ARTICULAR; INTRAMUSCULAR
Status: COMPLETED | OUTPATIENT
Start: 2024-09-25 | End: 2024-09-25

## 2024-09-25 NOTE — PROGRESS NOTES
History of Present Illness:   Patient presents today for evaluation of right hip pain.  Describes the pain is predominately in the lateral aspect of the hip endorses pain with specific activities but predominately with direct pressure.  The patient denies any significant groin pain, the patient denies any significant low back pain or any numbness or tingling.  Patient describes the pain as moderate in nature achy, sharp with direct pressure lying on that side.    Patient states that she has been having laterally based hip pain difficult time sleeping on his hip as well as some mechanical type symptoms.  Presents today for orthopedic evaluation and treatment has not attempted any types of injections in the past.    Past Medical History:   Diagnosis Date    Allergies     Asthma ([facility])     Bleeding disorder ([facility])     COPD (chronic obstructive pulmonary disease) ([facility])     Other conditions influencing health status     Mammogram normal     Past Surgical History:   Procedure Laterality Date    CARDIAC SURGERY      OTHER SURGICAL HISTORY  07/19/2022    Hand surgery    OTHER SURGICAL HISTORY  07/19/2022    Carpal tunnel surgery    OTHER SURGICAL HISTORY  07/19/2022    Throat surgery    OTHER SURGICAL HISTORY  07/19/2022    Cholecystectomy laparoscopic    OTHER SURGICAL HISTORY  07/19/2022    Hysterectomy    OTHER SURGICAL HISTORY  07/19/2022    Knee arthroscopy    SINUS SURGERY         Current Outpatient Medications:     albuterol 2.5 mg /3 mL (0.083 %) nebulizer solution, Inhale 3 mL (2.5 mg). USE 1 UNIT DOSE EVERY 4-6 HOURS PRN FOR WHEEZING, Disp: , Rfl:     albuterol 90 mcg/actuation inhaler, Inhale 2 puffs every 4 hours if needed for shortness of breath or wheezing., Disp: 18 g, Rfl: 2    benzonatate (Tessalon) 100 mg capsule, Take 1 capsule (100 mg) by mouth if needed for cough., Disp: 90 capsule, Rfl: 3    budesonide-formoteroL (Symbicort) 80-4.5 mcg/actuation inhaler, Inhale 2 puffs 2 times a day. RINSE  MOUTH AFTER USE, Disp: 1 each, Rfl: 3    loratadine (Claritin) 10 mg tablet, Take 1 tablet (10 mg) by mouth once daily., Disp: 90 tablet, Rfl: 3    losartan (Cozaar) 25 mg tablet, Take 2 tablets (50 mg) by mouth 2 times a day., Disp: 120 tablet, Rfl: 11    metoprolol succinate XL (Toprol-XL) 100 mg 24 hr tablet, Take 1.5 tablets (150 mg) by mouth once daily., Disp: 135 tablet, Rfl: 3    montelukast (Singulair) 10 mg tablet, Take 1 tablet (10 mg) by mouth once daily., Disp: 90 tablet, Rfl: 3    pantoprazole (ProtoNix) 40 mg EC tablet, Take 1 tablet (40 mg) by mouth once daily., Disp: , Rfl:     spironolactone (Aldactone) 25 mg tablet, Take 2 tablets (50 mg) by mouth once daily., Disp: 180 tablet, Rfl: 3    Review of Systems   GENERAL: Negative  GI: Negative  MUSCULOSKELETAL: See HPI  SKIN: Negative  NEURO:  Negative    Physical Examination:  Right Hip:  Normal gait  Mild Trendelenburg sign  Skin healthy and intact  No tenderness to palpation over lumbar spine  Positive tenderness over greater trochanter    Full forward flexion  Symmetric motion, no loss of internal rotation   No weakness with resisted hip flexion, abduction or adduction    Moderate discomfort with internal/external rotation of the hip  Negative straight leg raise  Neurovascular exam normal distally    Imaging:  XR hip right with pelvis when performed 2 or 3 views    Result Date: 9/25/2024  Interpreted By:  Giuseppe Garibay III, STUDY: XR HIP RIGHT WITH PELVIS WHEN PERFORMED 2 OR 3 VIEWS; ;  9/25/2024 8:39 am   INDICATION: Signs/Symptoms:PAIN.   ,M25.551 Pain in right hip   COMPARISON: None.   ACCESSION NUMBER(S): [id number]   ORDERING CLINICIAN: GIUSEPPE GARIBAY   FINDINGS: AP pelvis lateral right hip: No acute osseous abnormality no fracture or dislocation appreciated. Mild-to-moderate joint space narrowing about the right hip articulation there does appear to be a subchondral cyst about the superior acetabulum difficult to assess if this is  artifactual or not.       Mild to moderate hip arthritis     MACRO: None   Signed by: Jamar Silva III 9/25/2024 8:53 AM Dictation workstation:   [id number]     L Inj/Asp: R greater trochanteric bursa on 9/25/2024 9:22 AM  Indications: pain  Details: 22 G needle, lateral approach  Medications: 40 mg triamcinolone acetonide 40 mg/mL; 4 mL lidocaine 10 mg/mL (1 %)  Consent was given by the patient. Immediately prior to procedure a time out was called to verify the correct patient, procedure, equipment, support staff and site/side marked as required. Patient was prepped and draped in the usual sterile fashion.             Assessment:   Patient with right hip trochanteric bursitis, mild to moderate right hip arthritis    Plan:  Discussed trochanteric bursitis with the patient.  We reviewed a variety of treatment options including physical therapy for stretching.  We discussed topical modalities as well as oral anti-inflammatories risk benefits and contraindications.  We discussed the role for corticosteroid injections.  We reviewed the possibility of recurrence as well as concomitant abductor tendon tear.  Patient does wish to proceed with a hip bursa injection today discussed that this is both diagnostic and therapeutic.  If fails to improve may benefit from an intra-articular injection  I discussed risks and benefits of corticosteroid injection and the patient would like to proceed.  Discussed risks of skin depigmentation and the rare but possible intravascular injection of local anesthetic which can cause palpitations or arrhythmias. I discussed the possibility of a transient increase in blood sugar. I explained that the local anesthetic tends to work immediately, it may take 2-3 days for the full effect of the corticosteroid compound. Consent was obtained and side confirmed by the patient.

## 2024-09-25 NOTE — LETTER
September 25, 2024     Patient: Good Appiah   YOB: 1960   Date of Visit: 9/25/2024       To Whom It May Concern:     Good Appiah  was seen in my office today 9/25/24  . Please excuse her absence and or tardiness to attend this appointment.    If you have any questions or concerns, please don't hesitate to call.         Sincerely,        Jamar Silva MD

## 2024-11-04 ENCOUNTER — APPOINTMENT (OUTPATIENT)
Dept: ORTHOPEDIC SURGERY | Facility: CLINIC | Age: 64
End: 2024-11-04
Payer: COMMERCIAL